# Patient Record
Sex: FEMALE | Race: WHITE | Employment: OTHER | URBAN - METROPOLITAN AREA
[De-identification: names, ages, dates, MRNs, and addresses within clinical notes are randomized per-mention and may not be internally consistent; named-entity substitution may affect disease eponyms.]

---

## 2020-10-13 ENCOUNTER — OFFICE VISIT (OUTPATIENT)
Dept: ORTHOPEDIC SURGERY | Age: 65
End: 2020-10-13
Payer: MEDICARE

## 2020-10-13 VITALS — RESPIRATION RATE: 12 BRPM | HEIGHT: 67 IN | WEIGHT: 220 LBS | TEMPERATURE: 97.5 F | BODY MASS INDEX: 34.53 KG/M2

## 2020-10-13 PROCEDURE — 1036F TOBACCO NON-USER: CPT | Performed by: ORTHOPAEDIC SURGERY

## 2020-10-13 PROCEDURE — 1090F PRES/ABSN URINE INCON ASSESS: CPT | Performed by: ORTHOPAEDIC SURGERY

## 2020-10-13 PROCEDURE — 3017F COLORECTAL CA SCREEN DOC REV: CPT | Performed by: ORTHOPAEDIC SURGERY

## 2020-10-13 PROCEDURE — 4040F PNEUMOC VAC/ADMIN/RCVD: CPT | Performed by: ORTHOPAEDIC SURGERY

## 2020-10-13 PROCEDURE — G8419 CALC BMI OUT NRM PARAM NOF/U: HCPCS | Performed by: ORTHOPAEDIC SURGERY

## 2020-10-13 PROCEDURE — 1123F ACP DISCUSS/DSCN MKR DOCD: CPT | Performed by: ORTHOPAEDIC SURGERY

## 2020-10-13 PROCEDURE — G8400 PT W/DXA NO RESULTS DOC: HCPCS | Performed by: ORTHOPAEDIC SURGERY

## 2020-10-13 PROCEDURE — G8484 FLU IMMUNIZE NO ADMIN: HCPCS | Performed by: ORTHOPAEDIC SURGERY

## 2020-10-13 PROCEDURE — G8427 DOCREV CUR MEDS BY ELIG CLIN: HCPCS | Performed by: ORTHOPAEDIC SURGERY

## 2020-10-13 PROCEDURE — 99203 OFFICE O/P NEW LOW 30 MIN: CPT | Performed by: ORTHOPAEDIC SURGERY

## 2020-10-13 RX ORDER — LISINOPRIL 20 MG/1
TABLET ORAL
COMMUNITY
Start: 2020-08-04

## 2020-10-13 RX ORDER — GLIMEPIRIDE 4 MG/1
TABLET ORAL
COMMUNITY
Start: 2020-09-27

## 2020-10-13 RX ORDER — GLIMEPIRIDE 2 MG/1
TABLET ORAL
COMMUNITY
Start: 2020-08-04

## 2020-10-13 RX ORDER — CYCLOBENZAPRINE HCL 10 MG
TABLET ORAL
COMMUNITY
Start: 2020-09-28

## 2020-10-13 RX ORDER — ROSUVASTATIN CALCIUM 10 MG/1
TABLET, COATED ORAL
COMMUNITY
Start: 2020-07-27

## 2020-10-13 NOTE — PROGRESS NOTES
A heel pad, heel lift, and PFS Strap was ordered and placed on the patient for pain control and stabilization. Patient is ambulatory with weakness and instability therefore a heel pad, heel lift, and PFS Strap will help with the weakness, stabilization and pain control. The brace will improve ADL's.

## 2020-10-13 NOTE — PROGRESS NOTES
[facility] [address] [facility]  [address]  [address]  Dept: [phone]    Ambulatory Orthopedic Consult      CHIEF COMPLAINT:    Chief Complaint   Patient presents with    Ankle Pain     Right Ankle       HISTORY OF PRESENT ILLNESS:      The patient is a 77 y.o. female who is being seen for consultation and evaluation of pain at the posterior aspect of the right Achilles tendon/heel, which began atraumatically in June 2020, but has occurred years ago in the past. The pain is described mainly with mechanical terms (dull/sharp/throbbing). The pain is worse with activity and better with rest. The patient reports a progressive course. The patient has tried:      [x]  rest/activity modification          [x]  NSAIDs      []  opiates      []  orthotics        [x]  change in shoes   [x]  home exercises  []  physical therapy      [x]  CAM boot     []  brace:    []  injection:       []  surgery:      The patient is referred here today by her PCP and by her chiropractor, Dr. Bishop Lucio, who she reports has been performing laser treatments. REVIEW OF SYSTEMS:  Constitutional: Negative for fever. HENT: Negative for tinnitus. Eyes: Negative for pain. Respiratory: Negative for shortness of breath. Cardiovascular: Negative for chest pain. Gastrointestinal: Negative for abdominal pain. Genitourinary: Negative for dysuria. Skin: Negative for rash. Neurological: Negative for headaches. Hematological: Does not bruise/bleed easily. Musculoskeletal: See HPI for pertinent positives     Past Medical History:    She  has no past medical history on file. Past Surgical History:    She  has no past surgical history on file.      Current Medications:     Current Outpatient Medications:     cyclobenzaprine (FLEXERIL) 10 MG tablet, take 1 tablet by mouth three times a day if needed, Disp: , Rfl:     glimepiride (AMARYL) 2 MG tablet, take 1 tablet by mouth twice a day, Disp: , Rfl:     glimepiride (AMARYL) 4 MG tablet, take 1 tablet by mouth twice a day, Disp: , Rfl:     lisinopril (PRINIVIL;ZESTRIL) 20 MG tablet, take 1 tablet by mouth once daily, Disp: , Rfl:     metFORMIN (GLUCOPHAGE) 500 MG tablet, take 1 tablet by mouth twice a day with meals, Disp: , Rfl:     rosuvastatin (CRESTOR) 10 MG tablet, take 1 tablet by mouth once daily, Disp: , Rfl:      Allergies:    Penicillins    Family History:  family history is not on file. Social History:   Social History     Occupational History    Not on file   Tobacco Use    Smoking status: Never Smoker    Smokeless tobacco: Never Used   Substance and Sexual Activity    Alcohol use: Not on file    Drug use: Not on file    Sexual activity: Not on file     Occupation: Full-time hairdresser     OBJECTIVE:  Temp 97.5 °F (36.4 °C)   Resp 12   Ht 5' 7\" (1.702 m)   Wt 220 lb (99.8 kg)   BMI 34.46 kg/m²    Psych: alert and oriented to person, time, and place  Cardio:  well perfused extremities  Resp:  normal respiratory effort  Skin:  no cyanosis  Hem/lymph:  no lymphedema  Neuro:  sensation to light touch grossly intact throughout all nerve distributions in the foot   Musculoskeletal:    RLE:  Alignment:  Heel neutral   Vascular: Toes warm and well perfused, compartments soft/compressible. No significant swelling of foot. Skin: Intact without rash/lesions/AV malformations.   Strength: Able to fire/perform the following with appropriate strength:    [x]  Tib Ant:     [x]  Gastroc-Soleus:         [x]  Inversion:    [x]  Eversion:         [x]  FHL:     [x]  EHL:      Motion:  Normal for the following joints:    [x]  Ankle:      [x]  Subtalar:        [x]  1st MTP:      []  1st TMT:            Tenderness to Palpation:    Tenderness to palpation: Posterior aspect of the calcaneus at the insertion of the Achilles tendon with palpable/visible prominence  -no nodules/thickening of Achilles tendon palpated  -no palpable gap of Achilles tendon noted  -no pain with resisted plantarflexion      LLE:  Alignment:  Heel neutral   Vascular: Toes warm and well perfused, compartments soft/compressible. No significant swelling of foot. Skin: Intact without rash/lesions/AV malformations. Strength: Able to fire/perform the following with appropriate strength:    [x]  Tib Ant:     [x]  Gastroc-Soleus:         [x]  Inversion:    [x]  Eversion:         [x]  FHL:     [x]  EHL:      Motion:  Normal for the following joints:    [x]  Ankle:      [x]  Subtalar:        [x]  1st MTP:      []  1st TMT:            Tenderness to Palpation:    Tenderness to palpation: None  -no nodules/thickening of Achilles tendon palpated  -no palpable gap of Achilles tendon noted  -no pain with resisted plantarflexion      RADIOLOGY:   10/13/2020 FINDINGS:  Three weightbearing views (AP, Mortise, and Lateral) of the right ankle and three weightbearing views (AP, Oblique, Lateral) of the right foot were obtained in the office today and reviewed, revealing no acute fracture, dislocation, or radioopaque foreign body/tumor. The ankle mortise is maintained with no widening of the clear spaces. Overall alignment is satisfactory. Posterior calcaneal bone spurs at the achilles tendon insertion. IMPRESSION:  No acute fracture/dislocation. Electronically signed by David Lu MD      ASSESSMENT AND PLAN:  Lori Lopez was seen today for Ankle Pain (Right Ankle)  The encounter diagnosis was Achilles tendinitis of right lower extremity. Body mass index is 34.46 kg/m². She has right insertional Achilles tendinitis with a Allie's deformity. Notably, she has the past medical history as above, including but not limited to the following:  Non-insulin-dependent diabetes. I had a long discussion today with the patient about the likely diagnosis and its natural history, physical exam and imaging findings, as well as treatment options in detail.  We discussed rest/activity modification, swelling control, NSAIDs/Acetaminophen/topical anesthetics, orthotics/shoewear modification, bracing/immobilization, injections, and physical therapy. Surgically, we discussed a possible future debridement versus reconstruction of the Achilles tendon, with bone resection, and possible tendon transfer as needed, should the patient not respond significantly to conservative management. We did discuss today she does have significant tenderness directly over her very large bone spur/loose body, surgery would most likely provide her the best benefit in terms of pain relief. At this point, the patient is leaning toward surgery, and is considering proceeding in the winter, with hairdressing clientele ointments. The patient wishes to proceed with the recommendations as above. Orders/referrals were placed as below at today's visit. She will use her cam boot that she has at home, and she was provided heel lifts for her boot and for a shoe, which she will use to avoid pain provoking activity. We did discuss the risk of rupture. I referred the patient to physical therapy for my Achilles tendinopathy protocol. The patient was also provided an Achilles tendinitis strap, to help decrease mechanical irritation of the posterior aspect of her large bone spur. All questions were answered and the patient agrees with the above plan. The patient will return to clinic in 6 weeks without x-rays. At her next visit, we will discuss her treatment options again, including surgery as above, and prior to proceeding with surgery I will obtain an MRI and a hemoglobin A1c. Return in about 6 weeks (around 11/24/2020). No orders of the defined types were placed in this encounter.     Orders Placed This Encounter   Procedures    Ambulatory referral to Physical Therapy     Referral Priority:   Routine     Referral Type:   Eval and Treat     Referral Reason:   Specialty Services Required

## 2020-10-13 NOTE — LETTER
Dr. Cloud [place] [address]  [phone]        10/13/2020     Patient: Delfin Lefort  YOB: 1955    Dear Vega Centeno MD,    I had the pleasure of seeing one of your patients, Delfin Lefort, recently in the office. Below are the relevant portions of my assessment and plan of care. ASSESSMENT AND PLAN:  She has right insertional Achilles tendinitis with a Allie's deformity. Notably, she has the past medical history as above, including but not limited to the following:  Non-insulin-dependent diabetes. I had a long discussion today with the patient about the likely diagnosis and its natural history, physical exam and imaging findings, as well as treatment options in detail. We discussed rest/activity modification, swelling control, NSAIDs/Acetaminophen/topical anesthetics, orthotics/shoewear modification, bracing/immobilization, injections, and physical therapy. Surgically, we discussed a possible future debridement versus reconstruction of the Achilles tendon, with bone resection, and possible tendon transfer as needed, should the patient not respond significantly to conservative management. We did discuss today she does have significant tenderness directly over her very large bone spur/loose body, surgery would most likely provide her the best benefit in terms of pain relief. At this point, the patient is leaning toward surgery, and is considering proceeding in the winter, with hairdressing clientele ointments. The patient wishes to proceed with the recommendations as above. Orders/referrals were placed as below at today's visit. She will use her cam boot that she has at home, and she was provided heel lifts for her boot and for a shoe, which she will use to avoid pain provoking activity. We did discuss the risk of rupture.   I referred the patient to physical therapy for my Achilles tendinopathy protocol. The patient was also provided an Achilles tendinitis strap, to help decrease mechanical irritation of the posterior aspect of her large bone spur. All questions were answered and the patient agrees with the above plan. The patient will return to clinic in 6 weeks without x-rays. At her next visit, we will discuss her treatment options again, including surgery as above, and prior to proceeding with surgery I will obtain an MRI and a hemoglobin A1c. Thank you for allowing me to participate in the care of this patient. I look forward to serving you and your patients again in the future. Please don't hesitate to contact me at my mobile number .         Tahira Barragan MD  Orthopedic Surgery, Foot and Ankle

## 2020-10-19 ENCOUNTER — HOSPITAL ENCOUNTER (OUTPATIENT)
Dept: PHYSICAL THERAPY | Facility: CLINIC | Age: 65
Setting detail: THERAPIES SERIES
Discharge: HOME | End: 2020-10-19
Payer: MEDICARE

## 2020-10-19 PROCEDURE — 97110 THERAPEUTIC EXERCISES: CPT

## 2020-10-19 PROCEDURE — 97161 PT EVAL LOW COMPLEX 20 MIN: CPT

## 2020-10-19 NOTE — CONSULTS
[x] THE [facility] &  Therapy  [facility]   [place]: [phone]  F: [phone]     Physical Therapy Lower Extremity Evaluation    Date:  10/19/2020  Patient: Charlotte Neal  : 1955  MRN: [id number]  Physician: Dr Price Spore: Medicare (follow medicare guidelines)  Medical Diagnosis: RLE Achilles tendinopathy  Rehab Codes: M76.61  Onset date: 2020   Next 's appt. : 20    Subjective:   CC/HPI: Pt with RLE achilles pain in the tendon and heel, began in 2020 with history of irritation in the past as well years ago. Saw Dr Hailey Walter, XR (-) and is using the CAM boot with 2 heel lifts in the boot while at work, 1 heel lift in the shoe while out of it. Also given arch support strap he instructed to wear 24-7. PMHx: [] Unremarkable [] Diabetes [] HTN  [] Pacemaker   [] MI/Heart Problems [] Cancer [] Arthritis   [] Other:               [] Refer to full medical chart  In EPIC     Tests: [] X-Ray:  IMPRESSION:  No acute fracture/dislocation. [] MRI:    [] Other:     Medications:  [x] Refer to full medical record [] None [] Other:  Allergies:       [x] Refer to full medical record [] None [] Other:        Home type Double wide trailer    Stairs from outside 4 stairs   Stairs inside -   [facility]   Job status standing   Work Activities/duties  Currently working 4 days a week, 6-10 hr days   Recreational Activities Walking dog         Pain present?  yes   Location RLE heel   Pain Rating currently 0/10   Pain at worse 5/10   Pain at best 0/10   Description of pain Dull ache, stabbing occasionally    Altered Sensation Intact    What makes it worse Elevated, driving    What makes it better Ice, meds as needed    Symptom progression Same    Sleep Pain while sleeping              Objective:    ROM  ° A/P STRENGTH    Left Right Left Right   Hip Flex       Ext       ER       IR       ABD       ADD       Knee Flex       Ext Ankle PF       DF  (knee straight) 2 0  4   DF   (knee flexed) 10 10     Inv 10 10  4+   Ever 8 5  4         Foot Posture Index FPI-6 -2 -1 0 +1 +2 Comments   Talar head Palpation [] [] [] [x] []    Supra and infra lateral malleolar curvature  [] [] [] [x] []    Inversion/Eversion of calcaneus  [] [] [] [] [x]    Bulging in Talonavicular joint  [] [] [] [x] []    Congruence of the medial longitudinal arch [] [] [] [x] []    Abduction/Adduction of the forefoot on the rearfoot [] [] [] [] [x]          FPI-6 Score: 7   FPI-6 Scoring Scale:  -12 to -5 Highly Supinated  -1 to -4 Supinated  0 to +5 Neutral  +6 to +9 Pronated  +10 to +12 Highly Pronated      TESTS (+/-) Left Right Not Tested   Ant.  Drawer   []   Post. Drawer   []   Varus stress    []   Valgus Stress    []   Gastroc Equinus + + []   Talor Tilt   []      []     Foot/Ankle Mobility  Left  Right    Talocrural Jt  hypo    Subtalar Jt      1st MTP Jt   DF    Forefoot  hypo    Midfoot             OBSERVATION No Deficit Deficit Comments   Posture      Forward Head [] []    Rounded Shoulders [] []    Kyphosis [] []    Lordosis [] []    Genu Valgus [] []    Genu Varus [] []    Genu Recurvatum [] []    Cavo Varus Foot [] []    Neutral Foot [] []    Everett Valgus Foot [] []    Gastroc Equinus [] []    Hallux Valgus [] []    Pronation [] []    Supination [] []    Leg Length Discrp [] []     [] []    Palpation [] [x] RLE achilles tendon insertion lateral aspect    Sensation [] []    Edema [] []    Neurological [] []    Patellar Mobility [] []    Patellar Orientation [] []    Gait [] [x] Analysis:  RLE decreased stance, decreased heel/toe progression        FUNCTION Normal Difficult Unable   Sitting [] [] []   Standing [] [] []   Ambulation [] [] []   Groom/Dress [] [] []   Lift/Carry [] [] []   Stairs [] [x] []   Bending [] [] []   Squat [] [] []   Kneel [] [] []         BALANCE/PROPRIOCEPTION              [x] Not tested   Single leg stance       R L                                PAIN   Eyes open                             Sec. Sec                  . []    Eyes closed                          Sec. Sec                  . []         Flexibility Normal Left tight Right tight   Hip flexor [] [] [x]   quad [] [] [x]   HS [] [] [x]   piriformis [] [] []   ITB [] [] []   gastroc [] [] [x]   Soleus  [] [] []    [] [] []    [] [] []        Comments:      Assessment:        STG: (to be met in 10 treatments)  1. ? Pain: Decrease pain levels to 4/10 or less with ADLs  2. ? ROM: Increase flexibility and AROM limitations throughout to equal bilat to reduce difficulty with ADLs  3. ? Strength: Increase RLE strength to 5/5 MMT  4. Independent with Home Exercise Programs  5. Demonstrate Knowledge of fall prevention    LTG: (to be met in 20 treatments)  1. Improve score on assessment tool from LEFS to 50/80 or better  2. Reduce pain levels to 2/10                   Patient goals: Decrease pain     Rehab Potential:  [x] Good  [] Fair  [] Poor   Suggested Professional Referral:  [x] No  [] Yes:  Barriers to Goal Achievement[de-identified]  [x] No  [] Yes:  Domestic Concerns:  [x] No  [] Yes:    Pt. Education:  [x] Plans/Goals, Risks/Benefits discussed  [x] Home exercise program    Method of Education: [x] Verbal  [x] Demo  [] Written  Comprehension of Education:  [x] Verbalizes understanding. [x] Demonstrates understanding. [x] Needs Review. [] Demonstrates/verbalizes understanding of HEP/Ed previously given.     Treatment Plan:  [x] Therapeutic Exercise    [] Aquatic Therapy   [x] Manual Therapy     [] Electrical Stimulation  [x] Instruction in HEP      [] Lumbar/Cervical Traction  [] Neuromuscular Re-education [] Cold/hotpack  [] Iontophoresis: 4 mg/mL  [] Vasocompression (GameReady)                    Dexamethasone Sodium  [] Gait Training             Phosphate 40-80 mAmin         []  Medication allergies reviewed for use of    Dexamethasone Sodium Phosphate 4mg/ml     with iontophoresis treatments. Pt is not allergic. Frequency:  2 x/week for 20 visits    Todays Treatment:    Exercises:  Exercise    RLE Achilles  tendinopathy Reps/ Time Weight/ Level Comments         Nustep            *Toe yoga      *Talar doming      *Calf Inv belt S      *HS Belt S            BAPS       Balance board      SLS      TGym squats      TGym HR                  Other:  to RLE gastroc     Specific Instructions for next treatment: advance HEP     Evaluation Complexity:  History (Personal factors, comorbidities) [x] 0 [] 1-2 [] 3+   Exam (limitations, restrictions) [x] 1-2 [] 3 [] 4+   Clinical presentation (progression) [x] Stable [] Evolving  [] Unstable   Decision Making [x] Low [] Moderate [] High    [x] Low Complexity [] Moderate Complexity [] High Complexity       Treatment Charges: Mins Units   [x] Evaluation       [x]  Low       []  Moderate       []  High 20 1   []  Modalities     [x]  Ther Exercise 10 1   []  Manual Therapy     []  Ther Activities     []  Aquatics     []  Vasocompression     []  Other       TOTAL TREATMENT TIME: 30    Time in:1320   Time Out:1400    Electronically signed by: Gladis Raza, PT        Physician Signature:________________________________Date:__________________  By signing above or cosigning this note, I have reviewed this plan of care and certify a need for medically necessary rehabilitation services.      *PLEASE SIGN ABOVE AND FAX BACK ALL PAGES*

## 2020-10-22 ENCOUNTER — HOSPITAL ENCOUNTER (OUTPATIENT)
Dept: PHYSICAL THERAPY | Facility: CLINIC | Age: 65
Setting detail: THERAPIES SERIES
Discharge: HOME | End: 2020-10-22
Payer: MEDICARE

## 2020-10-22 PROCEDURE — 97110 THERAPEUTIC EXERCISES: CPT

## 2020-10-22 NOTE — FLOWSHEET NOTE
[] [facility]) - [facility] &  Therapy  [address]  [phone]  F: [phone] [] [address]  [place] [address]   [address]  P: [phone]  F: [phone] [] [address] &  Therapy  [address]  P: [phone]  F: [phone] [] [address]  P: [phone]  F: [phone] [] [address]  [facility]   [address]   [place]: [phone]  F: [phone]      Physical Therapy Daily Treatment Note    Date:  10/22/2020  Patient Name:  Dominique Carrel    :  1955  MRN: [id number]  Physician: Dr Diogenes Molina: Medicare (follow medicare guidelines)  Medical Diagnosis: RLE Achilles tendinopathy                   Rehab Codes: M76.61  Onset date: 2020                                 Next 's appt. : 20  Visit# / total visits: 2/10; Progress note for Medicare patient due at visit 10     Cancels/No Shows:     Subjective:    Pain:  [] Yes  [] No Location: R foot  Pain Rating: (0-10 scale) 0/10  Pain altered Tx:  [] No  [] Yes  Action:  Comments: Pt states she is wearing her CAM boot at work with two inserts and arrives with tennis shoes this date with one inserts. Pt states no pain, however, states the CAM boot she wears at work kills her with the two inserts. Pt states she is wearing her arch support strap all the time.       Objective:  Modalities:   Precautions:  Exercises:  Exercise     RLE Achilles  tendinopathy Reps/ Time Weight/ Level Comments             Nustep  10' L2           SB S 3x30\"     HS S 3x30\"               *Toe yoga  x20       *Talar doming  x20       *Calf Inv belt S  3x30\"                 Balance board  5' L2      SLS 3x30\"       TGym squats  2x10 L16     TGym HR  2x10 L16

## 2020-10-22 NOTE — FLOWSHEET NOTE
Medicare Cap   [] Physical Therapy  [] Speech Therapy  [] Occupational therapy  *PT and Speech caps combine      $2040 Cap limit < kx modifier needed        Patient Name: Susan Self: 1955    Note:  This is an estimate of charges billed.      Date of [address] Name # units/ charge $$$ charge Daily Total Charge Ongoing Total $$$   10/19 1eval+kevin   106.04 106.04   10/22 1ex+1man 25.26+18.45  43.71 149.75

## 2020-10-27 ENCOUNTER — HOSPITAL ENCOUNTER (OUTPATIENT)
Dept: PHYSICAL THERAPY | Facility: CLINIC | Age: 65
Setting detail: THERAPIES SERIES
Discharge: HOME | End: 2020-10-27
Payer: MEDICARE

## 2020-10-27 PROCEDURE — 97140 MANUAL THERAPY 1/> REGIONS: CPT

## 2020-10-27 PROCEDURE — 97032 APPL MODALITY 1+ESTIM EA 15: CPT

## 2020-10-27 PROCEDURE — 97110 THERAPEUTIC EXERCISES: CPT

## 2020-10-27 NOTE — FLOWSHEET NOTE
[] [facility]) - [facility] &  Therapy  [address]  [phone]  F: [phone] [] [address]  [address]   [address]  P: [phone]  F: [phone] [] [address] &  Therapy  [address]  P: [phone]  F: [phone] [] [address]  P: [phone]  F: [phone] [x] [facility] &  Therapy  [address] [facility] [address]   [address]   [place]: [phone]  F: [phone]      Physical Therapy Daily Treatment Note    Date:  10/27/2020  Patient Name:  Charlotte Neal    :  1955  MRN: [id number]  Physician: Dr Steven Olson: Medicare (follow medicare guidelines)  Medical Diagnosis: RLE Achilles tendinopathy                   Rehab Codes: M76.61  Onset date: 2020                                 Next 's appt. : 20  Visit# / total visits: 3/10; Progress note for Medicare patient due at visit 10     Cancels/No Shows:     Subjective:    Pain:  [] Yes  [x] No Location: R foot  Pain Rating: (0-10 scale) 0/10  Pain altered Tx:  [x] No  [] Yes  Action:  Comments: Pt arrives wearing tennis shoes and ankle brace donned this date. Mentions she has increased back pain today d/t sitting at work for long periods of time. Pt reports wearing her CAM boot 3 hours each day.      Objective:  Modalities:   Precautions:  Exercises:  Exercise     RLE Achilles  tendinopathy Reps/ Time Weight/ Level Comments             Nustep  10' L2           SB S 3x30\"     HS S 3x30\"               *Toe yoga  x20       *Talar doming  x20       *Calf Inv belt S  3x30\"                 Balance board  5' L2      SLS 3x30\"       TGym squats  2x10 L16     TGym HR  2x10 L16     4 way hip   x15  orange     BAPS board  U31  L2  4 directions   Other: Hypervolt RLE gastroc      Specific Instructions for next treatment: kristen      Treatment Charges: Mins Units   []  Modalities     [x]  Ther Exercise 30 2   [x]  Manual Therapy 10 1   []  Ther Activities     []  Aquatics     []  Vasocompression     []  Other     Total Treatment time 40 3       Assessment: [x] Progressing toward goals. Cont with exercises listed above with addition of BAPS board to increase ROM. Added 4 way hip this date with cues required for proper form and to complete exercises slow and controlled. Ended with hypervolt to R gastroc to decrease tension. [] No change. [] Other:  [] Patient would continue to benefit from skilled physical therapy services in order to:     STG/LTG    STG: (to be met in 10 treatments)  1. ? Pain: Decrease pain levels to 4/10 or less with ADLs  2. ? ROM: Increase flexibility and AROM limitations throughout to equal bilat to reduce difficulty with ADLs  3. ? Strength: Increase RLE strength to 5/5 MMT  4. Independent with Home Exercise Programs  5. Demonstrate Knowledge of fall prevention     LTG: (to be met in 20 treatments)  1. Improve score on assessment tool from LEFS to 50/80 or better  2. Reduce pain levels to 2/10                    Patient goals: Decrease pain      Rehab Potential:  [x]? Good  []? Fair  []? Poor    Suggested Professional Referral:  [x]? No  []? Yes:  Barriers to Goal Achievement[de-identified]  [x]? No  []? Yes:  Domestic Concerns:  [x]? No  []? Yes:     Pt. Education:  [x]? Plans/Goals, Risks/Benefits discussed  [x]? Home exercise program    Method of Education: [x]? Verbal  [x]? Demo  []? Written  Comprehension of Education:  [x]? Verbalizes understanding. [x]? Demonstrates understanding. [x]? Needs Review. []? Demonstrates/verbalizes understanding of HEP/Ed previously given.       Plan: [x] Continue current frequency toward long and short term goals.     [] Specific Instructions for subsequent treatments:       Time In: 6:00 pm            Time Out: 6:50 pm    Electronically signed by:  Shruthi Villagran PTA

## 2020-10-27 NOTE — FLOWSHEET NOTE
Medicare Cap   [] Physical Therapy  [] Speech Therapy  [] Occupational therapy  *PT and Speech caps combine      $2040 Cap limit < kx modifier needed        Patient Name: Dinesh Castellanos: 1955    Note:  This is an estimate of charges billed.      Date of [address] Name # units/ charge $$$ charge Daily Total Charge Ongoing Total $$$   10/19 1eval+kevin   106.04 106.04   10/22 1ex+1man 25.26+18.45  43.71 149.75   10/27 2ex +1 man 25.26+19.74+18.45  63.45 213.20

## 2020-10-29 ENCOUNTER — HOSPITAL ENCOUNTER (OUTPATIENT)
Dept: PHYSICAL THERAPY | Facility: CLINIC | Age: 65
Setting detail: THERAPIES SERIES
Discharge: HOME | End: 2020-10-29
Payer: MEDICARE

## 2020-10-29 PROCEDURE — 97110 THERAPEUTIC EXERCISES: CPT

## 2020-10-29 NOTE — FLOWSHEET NOTE
[] [facility]) - [facility] &  Therapy  [address]  [phone]  F: [phone] [] [address]  [facility]   [address]  P: [phone]  F: [phone] [] [address] &  Therapy  [address]  P: [phone]  F: [phone] [] [address]  P: [phone]  F: [phone] [x] [facility] &  Therapy  [facility]   [place]: [phone]  F: [phone]      Physical Therapy Daily Treatment Note    Date:  10/29/2020  Patient Name:  Dominique Carrel    :  1955  MRN: [id number]  Physician: Dr Diogenes Molina: Medicare (follow medicare guidelines)  Medical Diagnosis: RLE Achilles tendinopathy                   Rehab Codes: M76.61  Onset date: 2020                                 Next 's appt. : 20  Visit# / total visits: 4/10; Progress note for Medicare patient due at visit 10     Cancels/No Shows:      Subjective:    Pain:  [] Yes  [x] No Location: R foot  Pain Rating: (0-10 scale) 0/10  Pain altered Tx:  [x] No  [] Yes  Action:  Comments: Pt arrives wearing tennis shoes stating she is wearing her CAM boot at work but she can only where it for 3 hours because the two lift inserts really bother her. Pt reports she has to wear the CAM boot for 3 weeks at work. Pt reports she can finally sleep through the night.         Objective:  Modalities:   Precautions:  Exercises:  Exercise     RLE Achilles  tendinopathy Reps/ Time Weight/ Level Comments             Bike  10'            SB S 3x30\"     HS S 3x30\"               *Toe yoga  x20       *Talar doming  x20       *Calf Inv belt S  3x30\"                 Balance board  5' L2      SLS 3x30\"       TGym squats  2x10 L20     TGym HR  2x10 L20     Lunges  x15 Rebounder SLS  x15  Soccer ball    4 way hip   x15  Pett[place] CKC RLE   BAPS board  I02  L2  4 directions                     Other: Hypervolt RLE gastroc      Specific Instructions for next treatment:       Treatment Charges: Mins Units   []  Modalities     [x]  Ther Exercise 27 2   [x]  Manual Therapy     []  Ther Activities     []  Aquatics     []  Vasocompression     []  Other     Total Treatment time 27 2       Assessment: [x] Progressing toward goals. Progressed proprioceptive program, pt with good tolerance. Pt required cueing to perform 4-way hip with good form, fair correction post instruction. Pt notes increased fatigue with all exercises due to weakness. Ended with manual via hypervolt to gastroc with tightness noted proximal medial gastroc head. Pt requested to end therapy early due to work conflict. [] No change. [] Other:  [] Patient would continue to benefit from skilled physical therapy services in order to:     STG/LTG    STG: (to be met in 10 treatments)  1. ? Pain: Decrease pain levels to 4/10 or less with ADLs  2. ? ROM: Increase flexibility and AROM limitations throughout to equal bilat to reduce difficulty with ADLs  3. ? Strength: Increase RLE strength to 5/5 MMT  4. Independent with Home Exercise Programs  5. Demonstrate Knowledge of fall prevention     LTG: (to be met in 20 treatments)  1. Improve score on assessment tool from LEFS to 50/80 or better  2. Reduce pain levels to 2/10                    Patient goals: Decrease pain      Rehab Potential:  [x]? Good  []? Fair  []? Poor    Suggested Professional Referral:  [x]? No  []? Yes:  Barriers to Goal Achievement[de-identified]  [x]? No  []? Yes:  Domestic Concerns:  [x]? No  []? Yes:     Pt. Education:  [x]? Plans/Goals, Risks/Benefits discussed  [x]? Home exercise program    Method of Education: [x]? Verbal  [x]? Demo  []? Written  Comprehension of Education:  [x]? Verbalizes understanding. [x]? Demonstrates understanding. [x]?  Needs Review. []? Demonstrates/verbalizes understanding of HEP/Ed previously given.       Plan: [x] Continue current frequency toward long and short term goals.     [] Specific Instructions for subsequent treatments:       Time In: 8:00am            Time Out: 8:37 am    Electronically signed by:  Justin Pizarro PTA

## 2020-10-29 NOTE — FLOWSHEET NOTE
Medicare Cap   [] Physical Therapy  [] Speech Therapy  [] Occupational therapy  *PT and Speech caps combine      $2040 Cap limit < kx modifier needed        Patient Name: Emiliano Conti: 1955    Note:  This is an estimate of charges billed.      Date of [address] Name # units/ charge $$$ charge Daily Total Charge Ongoing Total $$$   10/19 1eval+kevin   106.04 106.04   10/22 1ex+1man 25.26+18.45  43.71 149.75   10/27 2ex +1 man 25.26+19.74+18.45  63.45 213.20   10/29 2ex 25.26+19.74  45.00 258.20

## 2020-11-02 ENCOUNTER — HOSPITAL ENCOUNTER (OUTPATIENT)
Dept: PHYSICAL THERAPY | Facility: CLINIC | Age: 65
Setting detail: THERAPIES SERIES
Discharge: HOME | End: 2020-11-02
Payer: MEDICARE

## 2020-11-02 PROCEDURE — 97110 THERAPEUTIC EXERCISES: CPT

## 2020-11-02 NOTE — PRE-CERTIFICATION NOTE
Medicare Cap   [] Physical Therapy  [] Speech Therapy  [] Occupational therapy  *PT and Speech caps combine      $2040 Cap limit < kx modifier needed        Patient Name: Beatrice Heimlich: 1955    Note:  This is an estimate of charges billed.      Date of [address] Name # units/ charge $$$ charge Daily Total Charge Ongoing Total $$$   10/19 1eval+kevin   106.04 106.04   10/22 1ex+1man 25.26+18.45  43.71 149.75   10/27 2ex +1 man 25.26+19.74+18.45  63.45 213.20   10/29 2ex 25.26+19.74  45.00 258.20     25.26+19.74+19.74  64.74 322.94

## 2020-11-02 NOTE — FLOWSHEET NOTE
[x] THE [facility] &  Therapy  [facility]   [place]: [phone]  F: [phone]      Physical Therapy Daily Treatment Note    Date:  2020  Patient Name:  Claudeen Mitts    :  1955  MRN: [id number]  Physician: Dr Noland Stake: Medicare (follow medicare guidelines)  Medical Diagnosis: RLE Achilles tendinopathy                   Rehab Codes: M76.61  Onset date: 2020                                 Next 's appt. : 20    Visit# / total visits: 5/10; Progress note for Medicare patient due at visit 10     Cancels/No Shows:      Subjective:    Pain:  [] Yes  [x] No Location: R foot  Pain Rating: (0-10 scale) 0/10  Pain altered Tx:  [x] No  [] Yes  Action:  Comments: Patient arrive noting minor soreness with overall improvement in symptoms noted. Objective:  Modalities:   Precautions:  Exercises:  Exercise     RLE Achilles  tendinopathy Reps/ Time Weight/ Level Comments             Bike  10'            SB S 3x30\"     HS S 3x30\"               *Toe yoga  x20       *Talar doming  x20       *Calf Inv belt S  3x30\"                 Balance board  5' L2      SLS 3x30\"       TGym squats  2x10 L20     TGym HR  2x10 L20     Lunges  x15     Rebounder SLS  x15  Soccer ball    4 way hip   x15  [place] CKC RLE   BAPS board  F12  L2  4 directions                     Other: Hypervolt RLE gastroc      Specific Instructions for next treatment:     Treatment Charges: Mins Units   []  Modalities     [x]  Ther Exercise 40 3   []  Manual Therapy     []  Ther Activities     []  Aquatics     []  Vasocompression     []  Other     Total Treatment time 40 3       Assessment: [x] Progressing toward goals. [] No change. [] Other:  [x] Patient would continue to benefit from skilled physical therapy services in order to: progress strength and ROM.     STG: (to be met in 10 treatments)  1. ? Pain: Decrease pain levels to 4/10 or less with ADLs  2. ? ROM: Increase flexibility and AROM limitations throughout to equal bilat to reduce difficulty with ADLs  3. ? Strength: Increase RLE strength to 5/5 MMT  4. Independent with Home Exercise Programs  5. Demonstrate Knowledge of fall prevention     LTG: (to be met in 20 treatments)  1. Improve score on assessment tool from LEFS to 50/80 or better  2. Reduce pain levels to 2/10                    Patient goals: Decrease pain     Pt. Education:  [x]? Plans/Goals, Risks/Benefits discussed  [x]? Home exercise program    Method of Education: [x]? Verbal  [x]? Demo  []? Written  Comprehension of Education:  [x]? Verbalizes understanding. [x]? Demonstrates understanding. [x]? Needs Review. []? Demonstrates/verbalizes understanding of HEP/Ed previously given.       Plan: [x] Continue current frequency toward long and short term goals.     [] Specific Instructions for subsequent treatments:       Time In: 1300              Time Out: 1350    Electronically signed by:  Luis Bragg PTA

## 2020-11-04 ENCOUNTER — HOSPITAL ENCOUNTER (OUTPATIENT)
Dept: PHYSICAL THERAPY | Facility: CLINIC | Age: 65
Setting detail: THERAPIES SERIES
Discharge: HOME | End: 2020-11-04
Payer: MEDICARE

## 2020-11-04 PROCEDURE — 97110 THERAPEUTIC EXERCISES: CPT

## 2020-11-04 NOTE — FLOWSHEET NOTE
[x] THE [facility] &  Therapy  [facility]   [place]: [phone]  F: [phone]      Physical Therapy Daily Treatment Note    Date:  2020  Patient Name:  Jesus Taylor    :  1955  MRN: [id number]  Physician: Dr Mehrdad Bond: Medicare (follow medicare guidelines)  Medical Diagnosis: RLE Achilles tendinopathy                   Rehab Codes: M76.61  Onset date: 2020                                 Next 's appt. : 20    Visit# / total visits: 6/10; Progress note for Medicare patient due at visit 10     Cancels/No Shows:      Subjective:    Pain:  [] Yes  [x] No Location: R foot  Pain Rating: (0-10 scale) 0/10  Pain altered Tx:  [x] No  [] Yes  Action:  Comments: Patient arrived stating her knee hurts today but that's to be expected. Pt reports her R foot feels better but her L foot is bothering her. Pt reports she has 2 inserts for her CAM boot when she wears it at work but she can only tolerate about 3 hours, therefore, took 1 inserts out and it seems to help more. Objective:  Modalities:   Precautions:  Exercises:  Exercise     RLE Achilles  tendinopathy Reps/ Time Weight/ Level Comments             Nustep   10'            SB S 3x30\"     HS S 3x30\"               Balance board  5' L2      SLS 3x30\"      TGym squats 2x10 L20 Completed 16 reps- pain in L knee   TGym HR 2x10 L20     BOSU Lunges x20     Rebounder SLS x20  Soccer ball   *4 way hip  x20 Orange    Step downs  x20 4\"                Other:     Specific Instructions for next treatment:     Treatment Charges: Mins Units   []  Modalities     [x]  Ther Exercise 30 2   []  Manual Therapy     []  Ther Activities     []  Aquatics     []  Vasocompression     []  Other     Total Treatment time 30 2       Assessment: [x] Progressing toward goals. Pt limited with exercises due to increased pain in bilateral knees this date.  Progressed program, pt notes pain in L knee with [employer] squats, able to complete 16 reps. Administered handout to begin 4-way hip at home, pt notes she has thera-band already and plans to complete 1-2x a day. Educated pt on importance of continuing HEP to decrease muscle tightness in gastroc and strengthen foot intrinsic musculature, pt understands noting she does not want surgery and needs to keep her symptoms minimal.              [] No change. [] Other:  [x] Patient would continue to benefit from skilled physical therapy services in order to: progress strength and ROM. STG: (to be met in 10 treatments)  1. ? Pain: Decrease pain levels to 4/10 or less with ADLs  2. ? ROM: Increase flexibility and AROM limitations throughout to equal bilat to reduce difficulty with ADLs  3. ? Strength: Increase RLE strength to 5/5 MMT  4. Independent with Home Exercise Programs  5. Demonstrate Knowledge of fall prevention     LTG: (to be met in 20 treatments)  1. Improve score on assessment tool from LEFS to 50/80 or better  2. Reduce pain levels to 2/10                    Patient goals: Decrease pain     Pt. Education:  [x]? Plans/Goals, Risks/Benefits discussed  [x]? Home exercise program    Method of Education: [x]? Verbal  [x]? Demo  []? Written  Comprehension of Education:  [x]? Verbalizes understanding. [x]? Demonstrates understanding. [x]? Needs Review. []? Demonstrates/verbalizes understanding of HEP/Ed previously given.       Plan: [x] Continue current frequency toward long and short term goals.     [] Specific Instructions for subsequent treatments:       Time In: 8:00am              Time Out: 8:40am    Electronically signed by:  Laurie Platt PTA

## 2020-11-04 NOTE — PRE-CERTIFICATION NOTE
Medicare Cap   [] Physical Therapy  [] Speech Therapy  [] Occupational therapy  *PT and Speech caps combine      $2040 Cap limit < kx modifier needed        Patient Name: Emiliano Conti: 1955    Note:  This is an estimate of charges billed.      Date of [address] Name # units/ charge $$$ charge Daily Total Charge Ongoing Total $$$   10/19 1eval+kevin   106.04 106.04   10/22 1ex+1man 25.26+18.45  43.71 149.75   10/27 2ex +1 man 25.26+19.74+18.45  63.45 213.20   10/29 2ex 25.26+19.74  45.00 258.20     25.26+19.74+19.74  64.74 322.94   11/4 2ex   45.00 367.94

## 2020-11-09 ENCOUNTER — HOSPITAL ENCOUNTER (OUTPATIENT)
Dept: PHYSICAL THERAPY | Facility: CLINIC | Age: 65
Setting detail: THERAPIES SERIES
Discharge: HOME | End: 2020-11-09
Payer: MEDICARE

## 2020-11-09 PROCEDURE — 97110 THERAPEUTIC EXERCISES: CPT

## 2020-11-09 PROCEDURE — 97140 MANUAL THERAPY 1/> REGIONS: CPT

## 2020-11-09 NOTE — FLOWSHEET NOTE
from skilled physical therapy services in order to: progress strength and ROM. STG: (to be met in 10 treatments)  1. ? Pain: Decrease pain levels to 4/10 or less with ADLs  2. ? ROM: Increase flexibility and AROM limitations throughout to equal bilat to reduce difficulty with ADLs  3. ? Strength: Increase RLE strength to 5/5 MMT  4. Independent with Home Exercise Programs  5. Demonstrate Knowledge of fall prevention     LTG: (to be met in 20 treatments)  1. Improve score on assessment tool from LEFS to 50/80 or better  2. Reduce pain levels to 2/10                    Patient goals: Decrease pain     Pt. Education:  [x]? Plans/Goals, Risks/Benefits discussed  [x]? Home exercise program    Method of Education: [x]? Verbal  [x]? Demo  []? Written  Comprehension of Education:  [x]? Verbalizes understanding. [x]? Demonstrates understanding. [x]? Needs Review. []? Demonstrates/verbalizes understanding of HEP/Ed previously given.       Plan: [x] Continue current frequency toward long and short term goals.     [] Specific Instructions for subsequent treatments:       Time In: 1000              Time Out: 1050    Electronically signed by:  Kuldip Sargent PTA

## 2020-11-09 NOTE — PRE-CERTIFICATION NOTE
Medicare Cap   [x] Physical Therapy  [] Speech Therapy  [] Occupational therapy  *PT and Speech caps combine      $2040 Cap limit < kx modifier needed        Patient Name: Asif Tyson  YOB: 1955    Note:  This is an estimate of charges billed.      Date of [address] Name # units/ charge $$$ charge Daily Total Charge Ongoing Total $$$   10/19 1eval+kevin   106.04 106.04   10/22 1ex+1man 25.26+18.45  43.71 149.75   10/27 2ex +1 man 25.26+19.74+18.45  63.45 213.20   10/29 2ex 25.26+19.74  45.00 258.20     25.26+19.74+19.74  64.74 322.94   11/4 2ex 25.26+19.74  45.00 367.94   11/9 2ex+1man 25.26+19.74+18.45  63.45 431.39

## 2020-11-13 ENCOUNTER — HOSPITAL ENCOUNTER (OUTPATIENT)
Dept: PHYSICAL THERAPY | Facility: CLINIC | Age: 65
Setting detail: THERAPIES SERIES
Discharge: HOME | End: 2020-11-13
Payer: MEDICARE

## 2020-11-13 PROCEDURE — 97110 THERAPEUTIC EXERCISES: CPT

## 2020-11-13 PROCEDURE — 97140 MANUAL THERAPY 1/> REGIONS: CPT

## 2020-11-13 NOTE — FLOWSHEET NOTE
[x] THE [facility] &  Therapy  [facility]   [place]: [phone]  F: [phone]      Physical Therapy Daily Treatment Note    Date:  2020  Patient Name:  Jesus Taylor    :  1955  MRN: [id number]  Physician: Dr Mehrdad Bond: Medicare (follow medicare guidelines)  Medical Diagnosis: RLE Achilles tendinopathy                   Rehab Codes: M76.61  Onset date: 2020                                 Next 's appt. : 20    Visit# / total visits: 8/10; Progress note for Medicare patient due at visit 10     Cancels/No Shows:      Subjective:    Pain:  [] Yes  [x] No Location: R foot  Pain Rating: (0-10 scale) 0/10  Pain altered Tx:  [x] No  [] Yes  Action:  Comments: Patient arrived stating she feels much better but now her L foot is starting to hurt. Objective:  Modalities:   Precautions:  Exercises:  Exercise     RLE Achilles  tendinopathy Reps/ Time Weight/ Level Comments             Nustep   10' L4           SB S 3x30\"     HS S 3x30\"               Balance board  5' L2      TGym squats 3x10 L20    TGym HR 3x10 L20     BOSU Lunges x20     Rebounder SLS x20  yellow ball   *4 way hip  x15 Green     Step downs  x20 4\"                Other: Hypervolt to Ermias gastroc      Specific Instructions for next treatment: Progress stability program.     Treatment Charges: Mins Units   []  Modalities     [x]  Ther Exercise 20 1   [x]  Manual Therapy 10 1   []  Ther Activities     []  Aquatics     []  Vasocompression     []  Other     Total Treatment time 30 2       Assessment: [x] Progressing toward goals. Pt with fair recall with HEP exercises requiring cueing. Pt with significant tightness in bilateral gastroc, applied manual with improvement post.      [] No change. [] Other:  [x] Patient would continue to benefit from skilled physical therapy services in order to: progress strength and ROM.     STG: (to be met in 10 treatments)  1. ? Pain: Decrease pain levels to 4/10 or less with ADLs  2. ? ROM: Increase flexibility and AROM limitations throughout to equal bilat to reduce difficulty with ADLs  3. ? Strength: Increase RLE strength to 5/5 MMT  4. Independent with Home Exercise Programs  5. Demonstrate Knowledge of fall prevention     LTG: (to be met in 20 treatments)  1. Improve score on assessment tool from LEFS to 50/80 or better  2. Reduce pain levels to 2/10                    Patient goals: Decrease pain     Pt. Education:  [x]? Plans/Goals, Risks/Benefits discussed  [x]? Home exercise program    Method of Education: [x]? Verbal  [x]? Demo  []? Written  Comprehension of Education:  [x]? Verbalizes understanding. [x]? Demonstrates understanding. [x]? Needs Review. []? Demonstrates/verbalizes understanding of HEP/Ed previously given.       Plan: [x] Continue current frequency toward long and short term goals.     [] Specific Instructions for subsequent treatments:       Time In: 2:00pm              Time Out: 2:40pm    Electronically signed by:  Justin Pizarro PTA

## 2020-11-16 ENCOUNTER — HOSPITAL ENCOUNTER (OUTPATIENT)
Dept: PHYSICAL THERAPY | Facility: CLINIC | Age: 65
Setting detail: THERAPIES SERIES
Discharge: HOME | End: 2020-11-16
Payer: MEDICARE

## 2020-11-16 PROCEDURE — 97110 THERAPEUTIC EXERCISES: CPT

## 2020-11-16 PROCEDURE — 97140 MANUAL THERAPY 1/> REGIONS: CPT

## 2020-11-16 NOTE — PRE-CERTIFICATION NOTE
Medicare Cap   [x] Physical Therapy  [] Speech Therapy  [] Occupational therapy  *PT and Speech caps combine      $2040 Cap limit < kx modifier needed        Patient Name: Florinda Juarez  YOB: 1955    Note:  This is an estimate of charges billed.      Date of [address] Name # units/ charge $$$ charge Daily Total Charge Ongoing Total $$$   10/19 1eval+kevin   106.04 106.04   10/22 1ex+1man 25.26+18.45  43.71 149.75   10/27 2ex +1 man 25.26+19.74+18.45  63.45 213.20   10/29 2ex 25.26+19.74  45.00 258.20     25.26+19.74+19.74  64.74 322.94   11/4 2ex 25.26+19.74  45.00 367.94   11/9 2ex+1man 25.26+19.74+18.45  63.45 431.39    2ex+1man 25.26+19.74+18.45  63.45     3ex+1man 25.26+19.74+19.74+18.45  83.19 578.03

## 2020-11-16 NOTE — FLOWSHEET NOTE
[x] THE [facility] &  Therapy  [facility]   [place]: [phone]  F: [phone]      Physical Therapy Daily Treatment Note    Date:  2020  Patient Name:  Delfin Lefort    :  1955  MRN: [id number]  Physician: Dr Araceli Woods: Medicare (follow medicare guidelines)  Medical Diagnosis: RLE Achilles tendinopathy                   Rehab Codes: M76.61  Onset date: 2020                                 Next 's appt. : 20    Visit# / total visits: 9/10; Progress note for Medicare patient due at visit 10     Cancels/No Shows:      Subjective:    Pain:  [] Yes  [x] No Location: R foot  Pain Rating: (0-10 scale) 0/10  Pain altered Tx:  [x] No  [] Yes  Action:  Comments: Patient arrived stating she feels much better but now her L foot is starting to hurt. Objective:  Modalities:   Precautions:  Exercises:  Exercise     RLE Achilles  tendinopathy Reps/ Time Weight/ Level Comments             Nustep   10' L4           SB S 3x30\"     HS S 3x30\"               Balance board  5' L2      TGym squats 3x10 L20    TGym HR 3x10 L20     BOSU Lunges x20     Rebounder SLS x20  yellow ball   *4 way hip  x15 Green     Step downs  x20 4\"                Other: Hypervolt to Ermias gastroc      Specific Instructions for next treatment: Progress stability program.     Treatment Charges: Mins Units   []  Modalities     [x]  Ther Exercise 30 2   [x]  Manual Therapy 10 1   []  Ther Activities     []  Aquatics     []  Vasocompression     []  Other     Total Treatment time 40 3       Assessment: [x] Progressing toward goals. Continued strength and stability progressions this date. Patient notes fatigue with overall progressions and mild knee pain with TG squats. Decreased tension noted post manual this date. [] No change.      [] Other:  [x] Patient would continue to benefit from skilled physical therapy services in order to: progress

## 2020-11-20 ENCOUNTER — HOSPITAL ENCOUNTER (OUTPATIENT)
Dept: PHYSICAL THERAPY | Facility: CLINIC | Age: 65
Setting detail: THERAPIES SERIES
Discharge: HOME | End: 2020-11-20
Payer: MEDICARE

## 2020-11-20 NOTE — FLOWSHEET NOTE
[] [facility]  [address]    [phone]  F: [phone]   [] [address]  [facility] 36   [address]  P: [phone]  F: [phone]  [] [address]  [address]  P: [phone]  F: [phone] [] [address]  P: [phone]  F: [phone]  [] [address]  [facility]   [address]   [place]: [phone]  F: [phone]   [] [address]  [place]: [phone]   F: [phone]     Physical Therapy Cancel/No Show note    Date: 2020  Patient: Rajani Riggins  : 1955  MRN: [id number]    Cancels/No Shows to date: For today's appointment patient:    [x]  Cancelled    [] Rescheduled appointment    [] No-show     Reason given by patient:    []  Patient ill    []  Conflicting appointment    [] No transportation      [] Conflict with work    [] No reason given    [] Weather related    [] [id number]    [] Other:      Comments: Pt feeling sick.          [] Next appointment was confirmed    Electronically signed by: David Pelaez PTA

## 2020-12-02 ENCOUNTER — OFFICE VISIT (OUTPATIENT)
Dept: ORTHOPEDIC SURGERY | Age: 65
End: 2020-12-02
Payer: MEDICARE

## 2020-12-02 VITALS — TEMPERATURE: 97.5 F | BODY MASS INDEX: 34.53 KG/M2 | RESPIRATION RATE: 12 BRPM | HEIGHT: 67 IN | WEIGHT: 220 LBS

## 2020-12-02 PROCEDURE — G8484 FLU IMMUNIZE NO ADMIN: HCPCS | Performed by: ORTHOPAEDIC SURGERY

## 2020-12-02 PROCEDURE — 1090F PRES/ABSN URINE INCON ASSESS: CPT | Performed by: ORTHOPAEDIC SURGERY

## 2020-12-02 PROCEDURE — G8400 PT W/DXA NO RESULTS DOC: HCPCS | Performed by: ORTHOPAEDIC SURGERY

## 2020-12-02 PROCEDURE — G8427 DOCREV CUR MEDS BY ELIG CLIN: HCPCS | Performed by: ORTHOPAEDIC SURGERY

## 2020-12-02 PROCEDURE — 1123F ACP DISCUSS/DSCN MKR DOCD: CPT | Performed by: ORTHOPAEDIC SURGERY

## 2020-12-02 PROCEDURE — G8417 CALC BMI ABV UP PARAM F/U: HCPCS | Performed by: ORTHOPAEDIC SURGERY

## 2020-12-02 PROCEDURE — 99213 OFFICE O/P EST LOW 20 MIN: CPT | Performed by: ORTHOPAEDIC SURGERY

## 2020-12-02 PROCEDURE — 4040F PNEUMOC VAC/ADMIN/RCVD: CPT | Performed by: ORTHOPAEDIC SURGERY

## 2020-12-02 PROCEDURE — 1036F TOBACCO NON-USER: CPT | Performed by: ORTHOPAEDIC SURGERY

## 2020-12-02 PROCEDURE — 3017F COLORECTAL CA SCREEN DOC REV: CPT | Performed by: ORTHOPAEDIC SURGERY

## 2020-12-02 NOTE — PROGRESS NOTES
[facility]  [address] [address]  Dept: [phone]    Ambulatory Orthopedic Consult      CHIEF COMPLAINT:    Chief Complaint   Patient presents with    Ankle Pain     Right Ankle       HISTORY OF PRESENT ILLNESS:      The patient is a 77 y.o. female who is being seen for consultation and evaluation of pain at the posterior aspect of the right Achilles tendon/heel, which began atraumatically in June 2020, but has occurred years ago in the past. The pain is described mainly with mechanical terms (dull/sharp/throbbing). The pain is worse with activity and better with rest. The patient reports a progressive course. The patient has tried:      [x]  rest/activity modification          [x]  NSAIDs      []  opiates      []  orthotics        [x]  change in shoes   [x]  home exercises  []  physical therapy      [x]  CAM boot     []  brace:    []  injection:       []  surgery:      The patient is referred here today by her PCP and by her chiropractor, Dr. Marlene Ho, who she reports has been performing laser treatments. INTERVAL HISTORY 12/2/2020:  She is seen again today in the office for follow up of a previous issue (as above). Since being seen last, the patient is doing better. At today's visit, she is using And Achilles tendinitis strap and a heel lift for her shoe. The location and quality of the pain have not significantly changed since the last visit. REVIEW OF SYSTEMS:  Constitutional: Negative for fever. HENT: Negative for tinnitus. Eyes: Negative for pain. Respiratory: Negative for shortness of breath. Cardiovascular: Negative for chest pain. Gastrointestinal: Negative for abdominal pain. Genitourinary: Negative for dysuria. Skin: Negative for rash. Neurological: Negative for headaches. Hematological: Does not bruise/bleed easily.    Musculoskeletal: See HPI for pertinent positives     Past Medical History:    She  has no past medical history on file. Past Surgical History:    She  has no past surgical history on file. Current Medications:     Current Outpatient Medications:     cyclobenzaprine (FLEXERIL) 10 MG tablet, take 1 tablet by mouth three times a day if needed, Disp: , Rfl:     glimepiride (AMARYL) 2 MG tablet, take 1 tablet by mouth twice a day, Disp: , Rfl:     glimepiride (AMARYL) 4 MG tablet, take 1 tablet by mouth twice a day, Disp: , Rfl:     lisinopril (PRINIVIL;ZESTRIL) 20 MG tablet, take 1 tablet by mouth once daily, Disp: , Rfl:     metFORMIN (GLUCOPHAGE) 500 MG tablet, take 1 tablet by mouth twice a day with meals, Disp: , Rfl:     rosuvastatin (CRESTOR) 10 MG tablet, take 1 tablet by mouth once daily, Disp: , Rfl:      Allergies:    Penicillins    Family History:  family history is not on file. Social History:   Social History     Occupational History    Not on file   Tobacco Use    Smoking status: Never Smoker    Smokeless tobacco: Never Used   Substance and Sexual Activity    Alcohol use: Not on file    Drug use: Not on file    Sexual activity: Not on file     Occupation: Full-time hairdresser     OBJECTIVE:  Temp 97.5 °F (36.4 °C)   Resp 12   Ht 5' 7\" (1.702 m)   Wt 220 lb (99.8 kg)   BMI 34.46 kg/m²    Psych: alert and oriented to person, time, and place  Cardio:  well perfused extremities  Resp:  normal respiratory effort  Skin:  no cyanosis  Hem/lymph:  no lymphedema  Neuro:  sensation to light touch grossly intact throughout all nerve distributions in the foot   Musculoskeletal:    RLE:  Alignment:  Heel neutral   Vascular: Toes warm and well perfused, compartments soft/compressible. No significant swelling of foot. Skin: Intact without rash/lesions/AV malformations.   Strength: Able to fire/perform the following with appropriate strength:    [x]  Tib Ant:     [x]  Gastroc-Soleus:         [x]  Inversion:    [x]  Eversion:         [x]  FHL:     [x] EHL:      Motion:  Normal for the following joints:    [x]  Ankle:      [x]  Subtalar:        [x]  1st MTP:      []  1st TMT:            Tenderness to Palpation:    Tenderness to palpation: Posterior aspect of the calcaneus at the insertion of the Achilles tendon with palpable/visible prominence--improved but still present  -no nodules/thickening of Achilles tendon palpated  -no palpable gap of Achilles tendon noted  -no pain with resisted plantarflexion      LLE:  Alignment:  Heel neutral   Vascular: Toes warm and well perfused, compartments soft/compressible. No significant swelling of foot. Skin: Intact without rash/lesions/AV malformations. Strength: Able to fire/perform the following with appropriate strength:    [x]  Tib Ant:     [x]  Gastroc-Soleus:         [x]  Inversion:    [x]  Eversion:         [x]  FHL:     [x]  EHL:      Motion:  Normal for the following joints:    [x]  Ankle:      [x]  Subtalar:        [x]  1st MTP:      []  1st TMT:            Tenderness to Palpation:    Tenderness to palpation: None  -no nodules/thickening of Achilles tendon palpated  -no palpable gap of Achilles tendon noted  -no pain with resisted plantarflexion      RADIOLOGY:   12/2/2020 No new radiology images today. Prior images reviewed for reference. FINDINGS:  Three weightbearing views (AP, Mortise, and Lateral) of the right ankle and three weightbearing views (AP, Oblique, Lateral) of the right foot were obtained in the office today and reviewed, revealing no acute fracture, dislocation, or radioopaque foreign body/tumor. The ankle mortise is maintained with no widening of the clear spaces. Overall alignment is satisfactory. Posterior calcaneal bone spurs at the achilles tendon insertion. IMPRESSION:  No acute fracture/dislocation.     Electronically signed by Ira Quispe MD      ASSESSMENT AND PLAN:  Rosa Cisse was seen today for Ankle Pain (Right Ankle)  The encounter diagnosis was Achilles tendinitis of right lower extremity. Body mass index is 34.46 kg/m². She has right insertional Achilles tendinitis with a Allie's deformity and very large calcification in the distal portion of the Achilles tendon. .     Notably, she has the past medical history as above, including but not limited to the following:  Non-insulin-dependent diabetes. I had another discussion today with the patient about the likely diagnosis and its natural history, physical exam and imaging findings, as well as treatment options in detail. Surgically, we discussed a possible future debridement versus reconstruction of the Achilles tendon, with bone resection, and possible tendon transfer as needed, should the patient not respond significantly to conservative management. At this point, she is doing very well with conservative management, and reports that her pain is \"90% better\", and thus I did not recommend surgery at this time, and she agrees. Orders/referrals were placed as below at today's visit. She may continue to use her heel lifts and Achilles tendinitis strap as needed. We have again discussed the risk of rupture. She will continue to perform home exercises per physical therapy. All questions were answered and the patient agrees with the above plan. The patient will return to clinic in 3 months as needed. At her next visit, depending on how she is doing, we may consider an MRI and a hemoglobin A1c, if we are considering surgery. Return in about 3 months (around 3/2/2021), or if symptoms worsen or fail to improve. No orders of the defined types were placed in this encounter. No orders of the defined types were placed in this encounter. Ruth Waldron MD  Orthopedic Surgery, Foot and Ankle        Please excuse any typos/errors, as this note was created with the assistance of voice recognition software.  While intending to generate a document that actually reflects the content of the visit, the document can still have some errors including those of syntax and sound-a-like substitutions which may escape proof reading. In such instances, actual meaning can be extrapolated by context.

## 2020-12-31 ENCOUNTER — TELEPHONE (OUTPATIENT)
Dept: ORTHOPEDIC SURGERY | Age: 65
End: 2020-12-31

## 2021-01-05 NOTE — TELEPHONE ENCOUNTER
Addressing with Dr Linda Yeboah. Per OV on 12/2/20 Dr Linda Yeboah felt surgery was not needed due to patient doing well with conservative management. She is now wanting surgery.

## 2021-01-08 ENCOUNTER — OFFICE VISIT (OUTPATIENT)
Dept: ORTHOPEDIC SURGERY | Age: 66
End: 2021-01-08
Payer: MEDICARE

## 2021-01-08 VITALS — WEIGHT: 220 LBS | RESPIRATION RATE: 12 BRPM | BODY MASS INDEX: 34.53 KG/M2 | HEIGHT: 67 IN | TEMPERATURE: 97.9 F

## 2021-01-08 DIAGNOSIS — M76.61 ACHILLES TENDINITIS OF RIGHT LOWER EXTREMITY: Primary | ICD-10-CM

## 2021-01-08 PROCEDURE — 1036F TOBACCO NON-USER: CPT | Performed by: ORTHOPAEDIC SURGERY

## 2021-01-08 PROCEDURE — 3017F COLORECTAL CA SCREEN DOC REV: CPT | Performed by: ORTHOPAEDIC SURGERY

## 2021-01-08 PROCEDURE — 1090F PRES/ABSN URINE INCON ASSESS: CPT | Performed by: ORTHOPAEDIC SURGERY

## 2021-01-08 PROCEDURE — G8400 PT W/DXA NO RESULTS DOC: HCPCS | Performed by: ORTHOPAEDIC SURGERY

## 2021-01-08 PROCEDURE — G8417 CALC BMI ABV UP PARAM F/U: HCPCS | Performed by: ORTHOPAEDIC SURGERY

## 2021-01-08 PROCEDURE — 1123F ACP DISCUSS/DSCN MKR DOCD: CPT | Performed by: ORTHOPAEDIC SURGERY

## 2021-01-08 PROCEDURE — 4040F PNEUMOC VAC/ADMIN/RCVD: CPT | Performed by: ORTHOPAEDIC SURGERY

## 2021-01-08 PROCEDURE — G8427 DOCREV CUR MEDS BY ELIG CLIN: HCPCS | Performed by: ORTHOPAEDIC SURGERY

## 2021-01-08 PROCEDURE — G8484 FLU IMMUNIZE NO ADMIN: HCPCS | Performed by: ORTHOPAEDIC SURGERY

## 2021-01-08 PROCEDURE — 99214 OFFICE O/P EST MOD 30 MIN: CPT | Performed by: ORTHOPAEDIC SURGERY

## 2021-01-08 NOTE — PROGRESS NOTES
[facility]  [address] [address]  Dept: [phone]    Ambulatory Orthopedic Consult      CHIEF COMPLAINT:    Chief Complaint   Patient presents with    Ankle Pain     Right Ankle       HISTORY OF PRESENT ILLNESS:      The patient is a 77 y.o. female who is being seen for consultation and evaluation of pain at the posterior aspect of the right Achilles tendon/heel, which began atraumatically in June 2020, but has occurred years ago in the past. The pain is described mainly with mechanical terms (dull/sharp/throbbing). The pain is worse with activity and better with rest. The patient reports a progressive course. The patient has tried:      [x]  rest/activity modification          [x]  NSAIDs      []  opiates      []  orthotics        [x]  change in shoes   [x]  home exercises  []  physical therapy      [x]  CAM boot     []  brace:    []  injection:       []  surgery:      The patient is referred here today by her PCP and by her chiropractor, Dr. Senait Coates, who she reports has been performing laser treatments. INTERVAL HISTORY 12/2/2020:  She is seen again today in the office for follow up of a previous issue (as above). Since being seen last, the patient is doing better. At today's visit, she is using And Achilles tendinitis strap and a heel lift for her shoe. The location and quality of the pain have not significantly changed since the last visit. INTERVAL HISTORY 1/8/2021:  She is seen again today in the office for follow up of a previous issue (as above). Since being seen last, the patient is doing about the same overall. At today's visit, she is not using a brace or assistive device. The location and quality of the pain have not significantly changed since the last visit. REVIEW OF SYSTEMS:  Constitutional: Negative for fever. HENT: Negative for tinnitus. Eyes: Negative for pain. Neuro:  sensation to light touch grossly intact throughout all nerve distributions in the foot   Musculoskeletal:    RLE:  Alignment:  Heel neutral   Vascular: Toes warm and well perfused, compartments soft/compressible. No significant swelling of foot. Skin: Intact without rash/lesions/AV malformations. Strength: Able to fire/perform the following with appropriate strength:    [x]  Tib Ant:     [x]  Gastroc-Soleus:         [x]  Inversion:    [x]  Eversion:         [x]  FHL:     [x]  EHL:      Motion:  Normal for the following joints:    [x]  Ankle:      [x]  Subtalar:        [x]  1st MTP:      []  1st TMT:            Tenderness to Palpation:    Tenderness to palpation: Posterior aspect of the calcaneus at the insertion of the Achilles tendon with palpable/visible prominence  -no nodules/thickening of Achilles tendon palpated  -no palpable gap of Achilles tendon noted  -no pain with resisted plantarflexion      LLE:  Alignment:  Heel neutral   Vascular: Toes warm and well perfused, compartments soft/compressible. No significant swelling of foot. Skin: Intact without rash/lesions/AV malformations. Strength: Able to fire/perform the following with appropriate strength:    [x]  Tib Ant:     [x]  Gastroc-Soleus:         [x]  Inversion:    [x]  Eversion:         [x]  FHL:     [x]  EHL:      Motion:  Normal for the following joints:    [x]  Ankle:      [x]  Subtalar:        [x]  1st MTP:      []  1st TMT:            Tenderness to Palpation:    Tenderness to palpation: None  -no nodules/thickening of Achilles tendon palpated  -no palpable gap of Achilles tendon noted  -no pain with resisted plantarflexion      RADIOLOGY:   1/8/2021 No new radiology images today. Prior images reviewed for reference. FINDINGS:  Three weightbearing views (AP, Mortise, and Lateral) of the right ankle and three weightbearing views (AP, Oblique, Lateral) of the right foot were obtained in the office today and reviewed, revealing no acute fracture, dislocation, or radioopaque foreign body/tumor. The ankle mortise is maintained with no widening of the clear spaces. Overall alignment is satisfactory. Posterior calcaneal bone spurs at the achilles tendon insertion. IMPRESSION:  No acute fracture/dislocation. Electronically signed by Ramiro Romberg, MD      ASSESSMENT AND PLAN:  Isaak Longoria was seen today for Ankle Pain (Right Ankle)  The encounter diagnosis was Achilles tendinitis of right lower extremity. Body mass index is 34.46 kg/m². She has right insertional Achilles tendinitis with a Allie's deformity and very large calcification in the distal portion of the Achilles tendon. .     Notably, she has the past medical history as above, including but not limited to the following:  Non-insulin-dependent diabetes. I had another discussion today with the patient about the likely diagnosis and its natural history, physical exam and imaging findings, as well as treatment options in detail. Surgically, we discussed a possible future debridement versus reconstruction of the Achilles tendon, with bone resection, and possible tendon transfer as needed, should the patient not respond significantly to conservative management. At today's visit, we did discuss at length the expected postoperative course, including anticipated time off of work. Due to the fact that she does report that she is not having significant pain at this time, I did not recommend proceeding with surgery, although she is here today to discuss surgery, and she does wish to pursue surgery. After a long discussion, she does report that she is most concerned about a possible Achilles tendon rupture, and we did discuss this at length, as well as reasonable ways to help decrease the risk of this. As a result of our long discussion, including the risks benefits and alternatives to surgery, the patient does wish to continue with conservative management for the time being. We discussed that she will continue to progress her activity, and if her pain becomes more significant or becomes a limiting factor, then we will again discuss surgery. Orders/referrals were placed as below at today's visit. She will continue to use her Achilles tendon strap and heel lifts for her shoe, as well as continue home exercises per physical therapy. She may also use her boot in the future as needed for pain. I provided a prescription for Voltaren (4g TOPL q QID PRN pain). I recommend this over the use of oral NSAIDs because given the patient's condition, the use of oral NSAIDs for an extended period of time will likely be necessary to help allow appropriate meaningful baseline function; and given the risk for development of side effects (GI bleeding/ulcers) with chronic use of oral anti-inflammatories, this is a much safer option. This is especially important in this situation given the patient's age as well. All questions were answered and the patient agrees with the above plan. The patient will return to clinic in the future as needed. At her next visit, depending on how she is doing, we may consider an MRI and a hemoglobin A1c, if we are considering surgery. Return if symptoms worsen or fail to improve. Orders Placed This Encounter   Medications    diclofenac sodium (VOLTAREN) 1 % GEL     Sig: Apply 2 g topically 4 times daily as needed for Pain     Dispense:  2 g     Refill:  0     No orders of the defined types were placed in this encounter. Yuko Judge MD  Orthopedic Surgery, Foot and Ankle        Please excuse any typos/errors, as this note was created with the assistance of voice recognition software. While intending to generate a document that actually reflects the content of the visit, the document can still have some errors including those of syntax and sound-a-like substitutions which may escape proof reading. In such instances, actual meaning can be extrapolated by context.

## 2023-10-17 ENCOUNTER — HOSPITAL ENCOUNTER (OUTPATIENT)
Dept: PHYSICAL THERAPY | Facility: CLINIC | Age: 68
Setting detail: THERAPIES SERIES
Discharge: HOME | End: 2023-10-17
Payer: MEDICARE

## 2023-10-17 PROCEDURE — 97110 THERAPEUTIC EXERCISES: CPT

## 2023-10-17 PROCEDURE — 97161 PT EVAL LOW COMPLEX 20 MIN: CPT

## 2023-10-17 NOTE — CONSULTS
[] [facility]) - [facility] &  Therapy  [address].  [phone]  F: [phone] [] [address] [facility]  [address]   [address]  P: [phone]  F: [phone] [] [address] &  Therapy  [address] [facility]  P: [phone]  F: [phone] [] [facility]  P: [phone]  F: [phone] [x] [address] [facility]   [address]   [place]: [phone]  F: [phone]      Physical Therapy Lower Extremity Evaluation    Date:  10/17/2023  Patient: Migel Omalley   : 1955  MRN: [id number]  Physician: Dr. Curry Amanda: Medicare (secondary MMO, vs BMN)  Medical Diagnosis: Unilateral primary osteoarthritis, right hip      Rehab Codes: M16.11, M62.81, R26.89, M25.551, M25.651  Onset date: referral date 10/9/23    Next Dr's appt. : 4 weeks     Subjective:   CC/HPI: Patient is a 76 y/o female presenting with right hip pain that has been going on for the last 3 months. She initially notived the pain when she went to lift her leg to get into a car. Pain has been gradually increasing since onset. She reports that she had increased pain yesterday after twisting weird on it. Has increased pain with prolonged sitting as well as when she tries to lay on her right side. Does not have pain with static standing. Was taking the Meloxicam that was prescribed, but stopped as it has not been helping her pain. X-rays have been ordered.        PMHx: [] Unremarkable [] Diabetes [x] HTN  [] Pacemaker   [] MI/Heart Problems [] Cancer [] Arthritis [x] Other: - see paper chart               [x] Refer to full medical chart  In EPIC     Comorbidities:   [x] Obesity [] Dialysis  [] N/A   [] Asthma/COPD [] Dementia [] Other:   [] Stroke [] Sleep apnea [] Other:   []

## 2023-10-23 ENCOUNTER — HOSPITAL ENCOUNTER (OUTPATIENT)
Dept: PHYSICAL THERAPY | Facility: CLINIC | Age: 68
Setting detail: THERAPIES SERIES
Discharge: HOME | End: 2023-10-23
Payer: MEDICARE

## 2023-10-23 PROCEDURE — 97110 THERAPEUTIC EXERCISES: CPT

## 2023-10-23 NOTE — FLOWSHEET NOTE
[] [address]  [address] [facility].  [phone]  F: [phone] [] [address] [facility]  [address]   [address]  P: [phone]  F: [phone] [] [address]  [address] [facility]  P: [phone]  F: [phone] [] [place]: [phone]  F: [phone] [x] [address] [place] [employer]  [address] [facility]   [address]   P: [phone]  F: [phone]  [] [address] [facility].   P: [phone]  F: [phone] [] [address] [facility]  [address]  P: [phone]  F: [phone] [] [address] [place] [employer]  [address] [facility]  [place]: [phone]  F: [phone] [] [address] [facility] [address]  [place]: [phone]  F: [phone]      Physical Therapy Daily Treatment Note    Date:  10/23/2023  Patient Name:  Agapito Dave    :  1955  MRN: [id number]  Physician: Dr. Kieran Webb: Medicare (secondary MMO, vs BMN)  Medical Diagnosis: Unilateral primary osteoarthritis, right hip                                  Rehab Codes: M16.11, M62.81, R26.89, M25.551, M25.651  Onset date: referral date 10/9/23                                         Next 's appt. : 4 weeks   Visit# / total visits: ; Progress note for Medicare patient due at visit 10   Cancels/No Shows: 0    Subjective:    Pain:  [x] Yes  [] No Location: right hip  Pain Rating: (0-10 scale) 0/10  Pain altered Tx:  [x] No  [] Yes  Action:  Comments: Patient reports that she is

## 2023-10-27 ENCOUNTER — HOSPITAL ENCOUNTER (OUTPATIENT)
Dept: PHYSICAL THERAPY | Facility: CLINIC | Age: 68
Setting detail: THERAPIES SERIES
Discharge: HOME | End: 2023-10-27
Payer: MEDICARE

## 2023-10-27 PROCEDURE — 97110 THERAPEUTIC EXERCISES: CPT

## 2023-10-27 NOTE — FLOWSHEET NOTE
[place]     Exercises  - Clamshell  - 1 x daily - 7 x weekly - 3 sets - 10 reps  - Supine Bridge  - 1 x daily - 7 x weekly - 3 sets - 10 reps  - Sidelying Hip Abduction  - 1 x daily - 7 x weekly - 3 sets - 10 reps  - Supine Active Straight Leg Raise  - 1 x daily - 7 x weekly - 3 sets - 10 reps  - Supine Piriformis Stretch with Foot on Ground  - 1 x daily - 7 x weekly - 3 sets - 30 sec hold       Comprehension of Education:  [x] Verbalizes understanding. [x] Demonstrates understanding. [x] Needs review. [] Demonstrates/verbalizes HEP/Ed previously given. Plan: [x] Continue current frequency toward long and short term goals.           Time In: 10:55am            Time Out: 11:30am     Electronically signed by:  Tameka Boo PTA

## 2023-10-30 ENCOUNTER — HOSPITAL ENCOUNTER (OUTPATIENT)
Dept: PHYSICAL THERAPY | Facility: CLINIC | Age: 68
Setting detail: THERAPIES SERIES
Discharge: HOME | End: 2023-10-30
Payer: MEDICARE

## 2023-10-30 PROCEDURE — 97110 THERAPEUTIC EXERCISES: CPT

## 2023-10-30 NOTE — FLOWSHEET NOTE
Exercises  - Clamshell  - 1 x daily - 7 x weekly - 3 sets - 10 reps  - Supine Bridge  - 1 x daily - 7 x weekly - 3 sets - 10 reps  - Sidelying Hip Abduction  - 1 x daily - 7 x weekly - 3 sets - 10 reps  - Supine Active Straight Leg Raise  - 1 x daily - 7 x weekly - 3 sets - 10 reps  - Supine Piriformis Stretch with Foot on Ground  - 1 x daily - 7 x weekly - 3 sets - 30 sec hold       Comprehension of Education:  [x] Verbalizes understanding. [x] Demonstrates understanding. [x] Needs review. [] Demonstrates/verbalizes HEP/Ed previously given. Plan: [x] Continue current frequency toward long and short term goals.           Time In: 08:50 am               Time Out: 9:20 am     Electronically signed by:  Merna Bragg PTA

## 2025-01-04 ENCOUNTER — HOSPITAL ENCOUNTER (OUTPATIENT)
Age: 70
Setting detail: SPECIMEN
Discharge: HOME | End: 2025-01-04

## 2025-01-04 LAB
ALBUMIN SERPL-MCNC: 3.3 G/DL (ref 3.5–5.2)
ALBUMIN/GLOB SERPL: 1.2 {RATIO} (ref 1–2.5)
ALP SERPL-CCNC: 66 U/L (ref 35–104)
ALT SERPL-CCNC: 17 U/L (ref 10–35)
ANION GAP SERPL CALCULATED.3IONS-SCNC: 10 MMOL/L (ref 9–16)
AST SERPL-CCNC: 22 U/L (ref 10–35)
BASOPHILS # BLD: 0 K/UL (ref 0–0.2)
BASOPHILS NFR BLD: 0 % (ref 0–2)
BILIRUB SERPL-MCNC: 0.4 MG/DL (ref 0–1.2)
BUN SERPL-MCNC: 15 MG/DL (ref 8–23)
CALCIUM SERPL-MCNC: 9.2 MG/DL (ref 8.8–10.2)
CHLORIDE SERPL-SCNC: 102 MMOL/L (ref 98–107)
CO2 SERPL-SCNC: 26 MMOL/L (ref 20–31)
CREAT SERPL-MCNC: 0.7 MG/DL (ref 0.5–0.9)
EOSINOPHIL # BLD: 0.12 K/UL (ref 0–0.44)
EOSINOPHILS RELATIVE PERCENT: 1 % (ref 1–4)
ERYTHROCYTE [DISTWIDTH] IN BLOOD BY AUTOMATED COUNT: 12.9 % (ref 11.8–14.4)
GFR, ESTIMATED: 88 ML/MIN/1.73M2
GLUCOSE SERPL-MCNC: 137 MG/DL (ref 82–115)
HCT VFR BLD AUTO: 33.4 % (ref 36.3–47.1)
HGB BLD-MCNC: 10.5 G/DL (ref 11.9–15.1)
IMM GRANULOCYTES # BLD AUTO: 0.12 K/UL (ref 0–0.3)
IMM GRANULOCYTES NFR BLD: 1 %
LYMPHOCYTES NFR BLD: 1.72 K/UL (ref 1.1–3.7)
LYMPHOCYTES RELATIVE PERCENT: 14 % (ref 24–43)
MAGNESIUM SERPL-MCNC: 1.8 MG/DL (ref 1.6–2.4)
MCH RBC QN AUTO: 29.5 PG (ref 25.2–33.5)
MCHC RBC AUTO-ENTMCNC: 31.4 G/DL (ref 28.4–34.8)
MCV RBC AUTO: 93.8 FL (ref 82.6–102.9)
MONOCYTES NFR BLD: 1.6 K/UL (ref 0.1–1.2)
MONOCYTES NFR BLD: 13 % (ref 3–12)
NEUTROPHILS NFR BLD: 71 % (ref 36–65)
NEUTS SEG NFR BLD: 8.74 K/UL (ref 1.5–8.1)
NRBC BLD-RTO: 0 PER 100 WBC
PLATELET # BLD AUTO: 381 K/UL (ref 138–453)
PMV BLD AUTO: 10.2 FL (ref 8.1–13.5)
POTASSIUM SERPL-SCNC: 4 MMOL/L (ref 3.7–5.3)
PROT SERPL-MCNC: 6.1 G/DL (ref 6.6–8.7)
RBC # BLD AUTO: 3.56 M/UL (ref 3.95–5.11)
SODIUM SERPL-SCNC: 137 MMOL/L (ref 136–145)
WBC OTHER # BLD: 12.3 K/UL (ref 3.5–11.3)

## 2025-01-04 PROCEDURE — 83735 ASSAY OF MAGNESIUM: CPT

## 2025-01-04 PROCEDURE — 80053 COMPREHEN METABOLIC PANEL: CPT

## 2025-01-04 PROCEDURE — 85025 COMPLETE CBC W/AUTO DIFF WBC: CPT

## 2025-01-04 PROCEDURE — 36415 COLL VENOUS BLD VENIPUNCTURE: CPT

## 2025-01-04 PROCEDURE — P9603 ONE-WAY ALLOW PRORATED MILES: HCPCS

## 2025-01-07 ENCOUNTER — HOSPITAL ENCOUNTER (OUTPATIENT)
Age: 70
Setting detail: SPECIMEN
Discharge: HOME | End: 2025-01-07

## 2025-01-07 LAB
ALBUMIN SERPL-MCNC: 2.9 G/DL (ref 3.5–5.2)
ALBUMIN/GLOB SERPL: 1.1 {RATIO} (ref 1–2.5)
ALP SERPL-CCNC: 63 U/L (ref 35–104)
ALT SERPL-CCNC: 12 U/L (ref 10–35)
ANION GAP SERPL CALCULATED.3IONS-SCNC: 9 MMOL/L (ref 9–16)
AST SERPL-CCNC: 15 U/L (ref 10–35)
BASOPHILS # BLD: 0.06 K/UL (ref 0–0.2)
BASOPHILS NFR BLD: 1 % (ref 0–2)
BILIRUB SERPL-MCNC: 0.4 MG/DL (ref 0–1.2)
BUN SERPL-MCNC: 18 MG/DL (ref 8–23)
CALCIUM SERPL-MCNC: 8.9 MG/DL (ref 8.8–10.2)
CHLORIDE SERPL-SCNC: 104 MMOL/L (ref 98–107)
CO2 SERPL-SCNC: 27 MMOL/L (ref 20–31)
CREAT SERPL-MCNC: 0.8 MG/DL (ref 0.5–0.9)
EOSINOPHIL # BLD: 0.52 K/UL (ref 0–0.44)
EOSINOPHILS RELATIVE PERCENT: 6 % (ref 1–4)
ERYTHROCYTE [DISTWIDTH] IN BLOOD BY AUTOMATED COUNT: 13 % (ref 11.8–14.4)
GFR, ESTIMATED: 83 ML/MIN/1.73M2
GLUCOSE SERPL-MCNC: 149 MG/DL (ref 82–115)
HCT VFR BLD AUTO: 31.3 % (ref 36.3–47.1)
HGB BLD-MCNC: 9.9 G/DL (ref 11.9–15.1)
IMM GRANULOCYTES # BLD AUTO: 0.05 K/UL (ref 0–0.3)
IMM GRANULOCYTES NFR BLD: 1 %
LYMPHOCYTES NFR BLD: 1.95 K/UL (ref 1.1–3.7)
LYMPHOCYTES RELATIVE PERCENT: 23 % (ref 24–43)
MCH RBC QN AUTO: 29.6 PG (ref 25.2–33.5)
MCHC RBC AUTO-ENTMCNC: 31.6 G/DL (ref 28.4–34.8)
MCV RBC AUTO: 93.7 FL (ref 82.6–102.9)
MONOCYTES NFR BLD: 1.01 K/UL (ref 0.1–1.2)
MONOCYTES NFR BLD: 12 % (ref 3–12)
NEUTROPHILS NFR BLD: 57 % (ref 36–65)
NEUTS SEG NFR BLD: 4.91 K/UL (ref 1.5–8.1)
NRBC BLD-RTO: 0 PER 100 WBC
PLATELET # BLD AUTO: 443 K/UL (ref 138–453)
PMV BLD AUTO: 9.6 FL (ref 8.1–13.5)
POTASSIUM SERPL-SCNC: 4 MMOL/L (ref 3.7–5.3)
PROT SERPL-MCNC: 5.6 G/DL (ref 6.6–8.7)
RBC # BLD AUTO: 3.34 M/UL (ref 3.95–5.11)
SODIUM SERPL-SCNC: 141 MMOL/L (ref 136–145)
WBC OTHER # BLD: 8.5 K/UL (ref 3.5–11.3)

## 2025-01-07 PROCEDURE — 80053 COMPREHEN METABOLIC PANEL: CPT

## 2025-01-07 PROCEDURE — 36415 COLL VENOUS BLD VENIPUNCTURE: CPT

## 2025-01-07 PROCEDURE — 85025 COMPLETE CBC W/AUTO DIFF WBC: CPT

## 2025-01-07 PROCEDURE — P9603 ONE-WAY ALLOW PRORATED MILES: HCPCS

## 2025-01-10 ENCOUNTER — HOSPITAL ENCOUNTER (OUTPATIENT)
Age: 70
Setting detail: SPECIMEN
Discharge: HOME | End: 2025-01-10

## 2025-01-10 LAB
ALBUMIN SERPL-MCNC: 3.2 G/DL (ref 3.5–5.2)
ALBUMIN/GLOB SERPL: 1.3 {RATIO} (ref 1–2.5)
ALP SERPL-CCNC: 69 U/L (ref 35–104)
ALT SERPL-CCNC: 10 U/L (ref 10–35)
ANION GAP SERPL CALCULATED.3IONS-SCNC: 9 MMOL/L (ref 9–16)
AST SERPL-CCNC: 17 U/L (ref 10–35)
BASOPHILS # BLD: 0.07 K/UL (ref 0–0.2)
BASOPHILS NFR BLD: 1 % (ref 0–2)
BILIRUB SERPL-MCNC: 0.4 MG/DL (ref 0–1.2)
BUN SERPL-MCNC: 18 MG/DL (ref 8–23)
CALCIUM SERPL-MCNC: 9.2 MG/DL (ref 8.8–10.2)
CHLORIDE SERPL-SCNC: 105 MMOL/L (ref 98–107)
CO2 SERPL-SCNC: 27 MMOL/L (ref 20–31)
CREAT SERPL-MCNC: 0.9 MG/DL (ref 0.5–0.9)
EOSINOPHIL # BLD: 0.5 K/UL (ref 0–0.44)
EOSINOPHILS RELATIVE PERCENT: 6 % (ref 1–4)
ERYTHROCYTE [DISTWIDTH] IN BLOOD BY AUTOMATED COUNT: 12.9 % (ref 11.8–14.4)
GFR, ESTIMATED: 69 ML/MIN/1.73M2
GLUCOSE SERPL-MCNC: 146 MG/DL (ref 82–115)
HCT VFR BLD AUTO: 32.9 % (ref 36.3–47.1)
HGB BLD-MCNC: 10 G/DL (ref 11.9–15.1)
IMM GRANULOCYTES # BLD AUTO: 0.04 K/UL (ref 0–0.3)
IMM GRANULOCYTES NFR BLD: 1 %
LYMPHOCYTES NFR BLD: 1.85 K/UL (ref 1.1–3.7)
LYMPHOCYTES RELATIVE PERCENT: 23 % (ref 24–43)
MCH RBC QN AUTO: 28.8 PG (ref 25.2–33.5)
MCHC RBC AUTO-ENTMCNC: 30.4 G/DL (ref 28.4–34.8)
MCV RBC AUTO: 94.8 FL (ref 82.6–102.9)
MONOCYTES NFR BLD: 0.95 K/UL (ref 0.1–1.2)
MONOCYTES NFR BLD: 12 % (ref 3–12)
NEUTROPHILS NFR BLD: 57 % (ref 36–65)
NEUTS SEG NFR BLD: 4.69 K/UL (ref 1.5–8.1)
NRBC BLD-RTO: 0 PER 100 WBC
PLATELET # BLD AUTO: 472 K/UL (ref 138–453)
PMV BLD AUTO: 9.2 FL (ref 8.1–13.5)
POTASSIUM SERPL-SCNC: 4.2 MMOL/L (ref 3.7–5.3)
PROT SERPL-MCNC: 5.7 G/DL (ref 6.6–8.7)
RBC # BLD AUTO: 3.47 M/UL (ref 3.95–5.11)
SODIUM SERPL-SCNC: 142 MMOL/L (ref 136–145)
WBC OTHER # BLD: 8.1 K/UL (ref 3.5–11.3)

## 2025-01-10 PROCEDURE — P9603 ONE-WAY ALLOW PRORATED MILES: HCPCS

## 2025-01-10 PROCEDURE — 80053 COMPREHEN METABOLIC PANEL: CPT

## 2025-01-10 PROCEDURE — 85025 COMPLETE CBC W/AUTO DIFF WBC: CPT

## 2025-01-10 PROCEDURE — 36415 COLL VENOUS BLD VENIPUNCTURE: CPT

## 2025-01-15 ENCOUNTER — HOSPITAL ENCOUNTER (OUTPATIENT)
Age: 70
Setting detail: SPECIMEN
Discharge: HOME | End: 2025-01-15
Payer: MEDICARE

## 2025-01-15 LAB
ANION GAP SERPL CALCULATED.3IONS-SCNC: 11 MMOL/L (ref 9–16)
BUN SERPL-MCNC: 17 MG/DL (ref 8–23)
CALCIUM SERPL-MCNC: 9.2 MG/DL (ref 8.8–10.2)
CHLORIDE SERPL-SCNC: 104 MMOL/L (ref 98–107)
CO2 SERPL-SCNC: 24 MMOL/L (ref 20–31)
CREAT SERPL-MCNC: 0.7 MG/DL (ref 0.5–0.9)
ERYTHROCYTE [DISTWIDTH] IN BLOOD BY AUTOMATED COUNT: 13.3 % (ref 11.8–14.4)
GFR, ESTIMATED: >90 ML/MIN/1.73M2
GLUCOSE SERPL-MCNC: 143 MG/DL (ref 82–115)
HCT VFR BLD AUTO: 35.2 % (ref 36.3–47.1)
HGB BLD-MCNC: 11.3 G/DL (ref 11.9–15.1)
MCH RBC QN AUTO: 29.7 PG (ref 25.2–33.5)
MCHC RBC AUTO-ENTMCNC: 32.1 G/DL (ref 28.4–34.8)
MCV RBC AUTO: 92.6 FL (ref 82.6–102.9)
NRBC BLD-RTO: 0 PER 100 WBC
PLATELET # BLD AUTO: 539 K/UL (ref 138–453)
PMV BLD AUTO: 9.3 FL (ref 8.1–13.5)
POTASSIUM SERPL-SCNC: 4.9 MMOL/L (ref 3.7–5.3)
RBC # BLD AUTO: 3.8 M/UL (ref 3.95–5.11)
SODIUM SERPL-SCNC: 140 MMOL/L (ref 136–145)
WBC OTHER # BLD: 9.8 K/UL (ref 3.5–11.3)

## 2025-01-15 PROCEDURE — 85027 COMPLETE CBC AUTOMATED: CPT

## 2025-01-15 PROCEDURE — 80048 BASIC METABOLIC PNL TOTAL CA: CPT

## 2025-02-03 ENCOUNTER — HOSPITAL ENCOUNTER (OUTPATIENT)
Dept: PHYSICAL THERAPY | Facility: CLINIC | Age: 70
Setting detail: THERAPIES SERIES
Discharge: HOME | End: 2025-02-03
Payer: MEDICARE

## 2025-02-03 PROCEDURE — 97161 PT EVAL LOW COMPLEX 20 MIN: CPT

## 2025-02-03 PROCEDURE — 97110 THERAPEUTIC EXERCISES: CPT

## 2025-02-03 NOTE — CONSULTS
[] [facility]  Outpatient Rehabilitation &  Therapy  [address]  [phone]  F: [phone] [] [facility]  Outpatient Rehabilitation &  Therapy  [address]   [address]  P: [phone]  F: [phone] [] [facility]  Outpatient Rehabilitation &  Therapy  [address]  P: [phone]  F: [phone] [] [facility]  Outpatient Rehabilitation &  Therapy  [address]  P: [phone]  F: [phone] [x] [facility]  Outpatient Rehabilitation &  Therapy  [address]   [address]   P: [phone]  F: [phone]      Physical Therapy Lower Extremity Evaluation    Date:  2/3/2025  Patient: Jennifer Llanes   : 1955  MRN: [id number]  Physician: Dr. Sloan      Insurance: Medicare (secondary MMO, vs BMN)  Medical Diagnosis: M17.12 - unliateral primary OA , left knee   Rehab Codes: M62.81 , R26.89 , M25.562 , M25.662   Onset date: 25    Next 's appt.: 2/10/25     Subjective:   CC/HPI: Patient is a 70 y/o female presenting with left knee pain s/p left TKA completed on 25. She ambulates into the clinic without AD, though she reports that she has a rolling walker. She reports that she went to [facility] for 10 days, and then home [facility] for 1 visit. She reports initially difficulty to get blood pressure under control. She also reports complication of getting a joint infection, she has been on antibiotics for 17 days.   We received notes from her surgeon, as he is outside of network and her medical history is not in the EMR.   She had a venous doppler to r/o DVT, this returned negative.  She has been walking outside at least 10 minutes.       PMHx: [] Unremarkable [x] Diabetes [x] HTN [] Pacemaker   [] MI/Heart Problems [] Cancer [] Arthritis [] Other:              [x] Refer to full medical chart  In EPIC     Comorbidities:   [] Obesity [] Dialysis  [] N/A   [] Asthma/COPD []

## 2025-02-07 ENCOUNTER — APPOINTMENT (OUTPATIENT)
Dept: PHYSICAL THERAPY | Facility: CLINIC | Age: 70
End: 2025-02-07
Payer: MEDICARE

## 2025-02-10 ENCOUNTER — APPOINTMENT (OUTPATIENT)
Dept: PHYSICAL THERAPY | Facility: CLINIC | Age: 70
End: 2025-02-10
Payer: MEDICARE

## 2025-02-14 ENCOUNTER — HOSPITAL ENCOUNTER (OUTPATIENT)
Dept: PHYSICAL THERAPY | Facility: CLINIC | Age: 70
Setting detail: THERAPIES SERIES
Discharge: HOME | End: 2025-02-14
Payer: MEDICARE

## 2025-02-14 PROCEDURE — 97016 VASOPNEUMATIC DEVICE THERAPY: CPT

## 2025-02-14 PROCEDURE — 97110 THERAPEUTIC EXERCISES: CPT

## 2025-02-14 NOTE — FLOWSHEET NOTE
[] [facility]  Outpatient Rehabilitation &  Therapy  [address]  [phone]  [phone] [] [facility]  Outpatient Rehabilitation &  Therapy  [address]  P: [phone]  F: [phone] [] [facility]  Outpatient Rehabilitation &  Therapy  [address]  P: [phone]  F: [phone] [] [facility]  Outpatient Rehabilitation &  Therapy  [address]  [phone]  [phone] [x] [facility]  Outpatient Rehabilitation &  Therapy  [address]   P: [phone]  F: [phone]  [] [facility]  Outpatient Rehabilitation &  Therapy  [address]  P: [phone]  F: [phone] [] [facility]  Outpatient Rehabilitation &  Therapy  [address]  P: [phone]  F: [phone] [] [facility]  Outpatient Rehabilitation &  Therapy  [address]  P: [phone]  F: [phone] [] [facility]  Outpatient Rehabilitation &  Therapy  [address]  P: [phone]  F: [phone]  [] [facility] Outpatient Rehabilitation &  Therapy  [address]  P: [phone]  F: [phone]     Physical Therapy Daily Treatment Note    Date:  2025  Patient Name:  Jennifer Llanes    :  1955  MRN: [id number]  Physician: Dr. Sloan                                   Insurance: Medicare (secondary MMO, vs BMN)   Medical Diagnosis: M17.12 - unliateral primary OA , left knee   Rehab Codes: M62.81 , R26.89 , M25.562 , M25.662   Onset date: 25                             Next 's appt.: 2/10/25   Visit# / total visits: ; Progress note for Medicare patient due at visit 10    Cancels/No Shows: 0    Subjective:    Pain:  [] Yes  [] No Location: left knee   Pain Rating: (0-10 scale) 7/10  Pain altered Tx:  [] No  [] Yes

## 2025-02-17 ENCOUNTER — HOSPITAL ENCOUNTER (OUTPATIENT)
Dept: PHYSICAL THERAPY | Facility: CLINIC | Age: 70
Setting detail: THERAPIES SERIES
Discharge: HOME | End: 2025-02-17
Payer: MEDICARE

## 2025-02-17 PROCEDURE — 97110 THERAPEUTIC EXERCISES: CPT

## 2025-02-17 NOTE — FLOWSHEET NOTE
[] [facility]  Outpatient Rehabilitation &  Therapy  [address]  [phone]  [phone] [] [facility]  Outpatient Rehabilitation &  Therapy  [address]  P: [phone]  F: [phone] [] [facility]  Outpatient Rehabilitation &  Therapy  [address]  P: [phone]  F: [phone] [] [facility]  Outpatient Rehabilitation &  Therapy  [address]  [phone]  [phone] [x] [facility]  Outpatient Rehabilitation &  Therapy  [address]   P: [phone]  F: [phone]  [] [facility]  Outpatient Rehabilitation &  Therapy  [address]  P: [phone]  F: [phone] [] [facility]  Outpatient Rehabilitation &  Therapy  [address]  P: [phone]  F: [phone] [] [facility]  Outpatient Rehabilitation &  Therapy  [address]  P: [phone]  F: [phone] [] [facility]  Outpatient Rehabilitation &  Therapy  [address]  P: [phone]  F: [phone]  [] [facility] Outpatient Rehabilitation &  Therapy  [address]  P: [phone]  F: [phone]     Physical Therapy Daily Treatment Note    Date:  2025  Patient Name:  Jennifer Llanes    :  1955  MRN: [id number]  Physician: Dr. Sloan                                   Insurance: Medicare (secondary MMO, vs BMN)   Medical Diagnosis: M17.12 - unliateral primary OA , left knee   Rehab Codes: M62.81 , R26.89 , M25.562 , M25.662   Onset date: 25                             Next 's appt.: 2/10/25   Visit# / total visits: 3/16; Progress note for Medicare patient due at visit 10    Cancels/No Shows: 0    Subjective:    Pain:  [x] Yes  [] No Location: left knee   Pain Rating: (0-10 scale) not rated/10  Pain altered Tx:  [x] No

## 2025-02-21 ENCOUNTER — HOSPITAL ENCOUNTER (OUTPATIENT)
Dept: PHYSICAL THERAPY | Facility: CLINIC | Age: 70
Setting detail: THERAPIES SERIES
Discharge: HOME | End: 2025-02-21
Payer: MEDICARE

## 2025-02-21 NOTE — FLOWSHEET NOTE
[] [facility]  Outpatient Rehabilitation &  Therapy  [address]  [phone]  [phone] [] [facility]  Outpatient Rehabilitation &  Therapy  [address]  P: [phone]  F: [phone] [] [facility]  Outpatient Rehabilitation &  Therapy  [address]  P: [phone]  F: [phone] [] [facility]  Outpatient Rehabilitation &  Therapy  [address]  [phone]  [phone] [x] [facility]  Outpatient Rehabilitation &  Therapy  [address]   P: [phone]  F: [phone]  [] [facility]  Outpatient Rehabilitation &  Therapy  [address]  P: [phone]  F: [phone] [] [facility]  Outpatient Rehabilitation &  Therapy  [address]  P: [phone]  F: [phone] [] [facility]  Outpatient Rehabilitation &  Therapy  [address]  P: [phone]  F: [phone] [] [facility]  Outpatient Rehabilitation &  Therapy  [address]  P: [phone]  F: [phone]  [] [facility] Outpatient Rehabilitation &  Therapy  [address]  P: [phone]  F: [phone]     Therapy Cancel/No Show note    Date: 2025  Patient: Jennifer CHAPPELL Shraddha  : 1955  MRN: [id number]    Cancels/No Shows to date: 0    For today's appointment patient:    [x]  Cancelled    [] Rescheduled appointment    [] No-show     Reason given by patient:    [x]  Patient ill    []  Conflicting appointment    [] No transportation      [] Conflict with work    [] No reason given    [] Weather related    [] COVID-19    [] Other:      Comments:        [x] Next appointment was confirmed    Electronically signed by: Katie Ibarra

## 2025-02-24 ENCOUNTER — HOSPITAL ENCOUNTER (OUTPATIENT)
Dept: PHYSICAL THERAPY | Facility: CLINIC | Age: 70
Setting detail: THERAPIES SERIES
Discharge: HOME | End: 2025-02-24
Payer: MEDICARE

## 2025-02-24 NOTE — FLOWSHEET NOTE
[] [facility]  Outpatient Rehabilitation &  Therapy  [address]  [phone]  [phone] [] [facility]  Outpatient Rehabilitation &  Therapy  [address]  P: [phone]  F: [phone] [] [facility]  Outpatient Rehabilitation &  Therapy  [address]  P: [phone]  F: [phone] [] [facility]  Outpatient Rehabilitation &  Therapy  [address]  [phone]  [phone] [x] [facility]  Outpatient Rehabilitation &  Therapy  [address]   P: [phone]  F: [phone]  [] [facility]  Outpatient Rehabilitation &  Therapy  [address]  P: [phone]  F: [phone] [] [facility]  Outpatient Rehabilitation &  Therapy  [address]  P: [phone]  F: [phone] [] [facility]  Outpatient Rehabilitation &  Therapy  [address]  P: [phone]  F: [phone] [] [facility]  Outpatient Rehabilitation &  Therapy  [address]  P: [phone]  F: [phone]  [] [facility] Outpatient Rehabilitation &  Therapy  [address]  P: [phone]  F: [phone]     Therapy Cancel/No Show note    Date: 2025  Patient: Jennifer CHAPPELL Shraddha  : 1955  MRN: [id number]    Cancels/No Shows to date: 0    For today's appointment patient:    [x]  Cancelled    [] Rescheduled appointment    [] No-show     Reason given by patient:    []  Patient ill    []  Conflicting appointment    [] No transportation      [] Conflict with work    [x] No reason given    [] Weather related    [] COVID-19    [] Other:      Comments:        [] Next appointment was confirmed    Electronically signed by: RONNIE CUADRA, PTA

## 2025-02-28 ENCOUNTER — APPOINTMENT (OUTPATIENT)
Dept: PHYSICAL THERAPY | Facility: CLINIC | Age: 70
End: 2025-02-28
Payer: MEDICARE

## 2025-03-14 ENCOUNTER — HOSPITAL ENCOUNTER (OUTPATIENT)
Dept: PHYSICAL THERAPY | Facility: CLINIC | Age: 70
Setting detail: THERAPIES SERIES
Discharge: HOME | End: 2025-03-14

## 2025-03-14 NOTE — FLOWSHEET NOTE
[] [facility]  Outpatient Rehabilitation &  Therapy  [address]  [phone]  [phone] [] [facility]  Outpatient Rehabilitation &  Therapy  [address]  P: [phone]  F: [phone] [] [facility]  Outpatient Rehabilitation &  Therapy  [address]  P: [phone]  F: [phone] [] [facility]  Outpatient Rehabilitation &  Therapy  [address]  [phone]  [phone] [x] [facility]  Outpatient Rehabilitation &  Therapy  [address]   P: [phone]  F: [phone]  [] [facility]  Outpatient Rehabilitation &  Therapy  [address]  P: [phone]  F: [phone] [] [facility]  Outpatient Rehabilitation &  Therapy  [address]  P: [phone]  F: [phone] [] [facility]  Outpatient Rehabilitation &  Therapy  [address]  P: [phone]  F: [phone] [] [facility]  Outpatient Rehabilitation &  Therapy  [address]  P: [phone]  F: [phone]  [] [facility] Outpatient Rehabilitation &  Therapy  [address]  P: [phone]  F: [phone]     Therapy Cancel/No Show note    Date: 3/14/2025  Patient: Jennifer CHAPPELL Shraddha  : 1955  MRN: [id number]    Cancels/No Shows to date:     For today's appointment patient:    [x]  Cancelled    [] Rescheduled appointment    [] No-show     Reason given by patient:    []  Patient ill    []  Conflicting appointment    [] No transportation      [] Conflict with work    [] No reason given    [] Weather related    [] COVID-19    [] Other:      Comments:        [] Next appointment was confirmed    Electronically signed by: Katie Ibarra

## 2025-03-31 ENCOUNTER — HOSPITAL ENCOUNTER (OUTPATIENT)
Dept: PHYSICAL THERAPY | Facility: CLINIC | Age: 70
Setting detail: THERAPIES SERIES
Discharge: HOME | End: 2025-03-31
Payer: MEDICARE

## 2025-03-31 PROCEDURE — 97110 THERAPEUTIC EXERCISES: CPT

## 2025-03-31 NOTE — FLOWSHEET NOTE
daily - 7 x weekly - 3 sets - 10 reps    Comprehension of Education:  [x] Verbalizes understanding.  [x] Demonstrates understanding.  [x] Needs review.  [] Demonstrates/verbalizes HEP/Ed previously given.     Plan: [x] Continue current frequency toward long and short term goals.    [x] Specific Instructions for subsequent treatments: see above        Time In: 1:55p            Time Out: 2:37a      Electronically signed by:  Mely Gomez PT